# Patient Record
Sex: FEMALE | Race: WHITE | Employment: FULL TIME | URBAN - METROPOLITAN AREA
[De-identification: names, ages, dates, MRNs, and addresses within clinical notes are randomized per-mention and may not be internally consistent; named-entity substitution may affect disease eponyms.]

---

## 2022-02-23 ENCOUNTER — APPOINTMENT (OUTPATIENT)
Dept: CT IMAGING | Age: 42
End: 2022-02-23

## 2022-02-23 ENCOUNTER — HOSPITAL ENCOUNTER (EMERGENCY)
Age: 42
Discharge: HOME | End: 2022-02-23
Attending: EMERGENCY MEDICINE

## 2022-02-23 VITALS
DIASTOLIC BLOOD PRESSURE: 86 MMHG | HEART RATE: 88 BPM | WEIGHT: 165 LBS | BODY MASS INDEX: 25.9 KG/M2 | RESPIRATION RATE: 16 BRPM | SYSTOLIC BLOOD PRESSURE: 172 MMHG | OXYGEN SATURATION: 98 % | HEIGHT: 67 IN | TEMPERATURE: 97.6 F

## 2022-02-23 DIAGNOSIS — R51.9 ACUTE NONINTRACTABLE HEADACHE, UNSPECIFIED HEADACHE TYPE: Primary | ICD-10-CM

## 2022-02-23 DIAGNOSIS — J01.30 ACUTE NON-RECURRENT SPHENOIDAL SINUSITIS: ICD-10-CM

## 2022-02-23 LAB
ANION GAP SERPL CALCULATED.3IONS-SCNC: 14 MMOL/L (ref 7–16)
BASOPHILS ABSOLUTE: 0.03 E9/L (ref 0–0.2)
BASOPHILS RELATIVE PERCENT: 0.3 % (ref 0–2)
BUN BLDV-MCNC: 14 MG/DL (ref 6–20)
CALCIUM SERPL-MCNC: 9.3 MG/DL (ref 8.6–10.2)
CHLORIDE BLD-SCNC: 99 MMOL/L (ref 98–107)
CO2: 23 MMOL/L (ref 22–29)
CREAT SERPL-MCNC: 0.7 MG/DL (ref 0.5–1)
EOSINOPHILS ABSOLUTE: 0.17 E9/L (ref 0.05–0.5)
EOSINOPHILS RELATIVE PERCENT: 1.6 % (ref 0–6)
GFR AFRICAN AMERICAN: >60
GFR NON-AFRICAN AMERICAN: >60 ML/MIN/1.73
GLUCOSE BLD-MCNC: 128 MG/DL (ref 74–99)
HCT VFR BLD CALC: 39.2 % (ref 34–48)
HEMOGLOBIN: 12.8 G/DL (ref 11.5–15.5)
IMMATURE GRANULOCYTES #: 0.03 E9/L
IMMATURE GRANULOCYTES %: 0.3 % (ref 0–5)
LYMPHOCYTES ABSOLUTE: 2.1 E9/L (ref 1.5–4)
LYMPHOCYTES RELATIVE PERCENT: 19.7 % (ref 20–42)
MCH RBC QN AUTO: 26.8 PG (ref 26–35)
MCHC RBC AUTO-ENTMCNC: 32.7 % (ref 32–34.5)
MCV RBC AUTO: 82 FL (ref 80–99.9)
MONOCYTES ABSOLUTE: 0.69 E9/L (ref 0.1–0.95)
MONOCYTES RELATIVE PERCENT: 6.5 % (ref 2–12)
NEUTROPHILS ABSOLUTE: 7.65 E9/L (ref 1.8–7.3)
NEUTROPHILS RELATIVE PERCENT: 71.6 % (ref 43–80)
PDW BLD-RTO: 13 FL (ref 11.5–15)
PLATELET # BLD: 337 E9/L (ref 130–450)
PMV BLD AUTO: 10.8 FL (ref 7–12)
POTASSIUM REFLEX MAGNESIUM: 3.9 MMOL/L (ref 3.5–5)
RBC # BLD: 4.78 E12/L (ref 3.5–5.5)
SODIUM BLD-SCNC: 136 MMOL/L (ref 132–146)
WBC # BLD: 10.7 E9/L (ref 4.5–11.5)

## 2022-02-23 PROCEDURE — 96375 TX/PRO/DX INJ NEW DRUG ADDON: CPT

## 2022-02-23 PROCEDURE — 70450 CT HEAD/BRAIN W/O DYE: CPT

## 2022-02-23 PROCEDURE — 99282 EMERGENCY DEPT VISIT SF MDM: CPT

## 2022-02-23 PROCEDURE — 2580000003 HC RX 258: Performed by: STUDENT IN AN ORGANIZED HEALTH CARE EDUCATION/TRAINING PROGRAM

## 2022-02-23 PROCEDURE — 6360000002 HC RX W HCPCS: Performed by: STUDENT IN AN ORGANIZED HEALTH CARE EDUCATION/TRAINING PROGRAM

## 2022-02-23 PROCEDURE — 96374 THER/PROPH/DIAG INJ IV PUSH: CPT

## 2022-02-23 PROCEDURE — 85025 COMPLETE CBC W/AUTO DIFF WBC: CPT

## 2022-02-23 PROCEDURE — 80048 BASIC METABOLIC PNL TOTAL CA: CPT

## 2022-02-23 RX ORDER — DIPHENHYDRAMINE HYDROCHLORIDE 50 MG/ML
50 INJECTION INTRAMUSCULAR; INTRAVENOUS ONCE
Status: COMPLETED | OUTPATIENT
Start: 2022-02-23 | End: 2022-02-23

## 2022-02-23 RX ORDER — 0.9 % SODIUM CHLORIDE 0.9 %
1000 INTRAVENOUS SOLUTION INTRAVENOUS ONCE
Status: COMPLETED | OUTPATIENT
Start: 2022-02-23 | End: 2022-02-23

## 2022-02-23 RX ORDER — PROCHLORPERAZINE EDISYLATE 5 MG/ML
10 INJECTION INTRAMUSCULAR; INTRAVENOUS ONCE
Status: COMPLETED | OUTPATIENT
Start: 2022-02-23 | End: 2022-02-23

## 2022-02-23 RX ADMIN — DIPHENHYDRAMINE HYDROCHLORIDE 50 MG: 50 INJECTION, SOLUTION INTRAMUSCULAR; INTRAVENOUS at 16:40

## 2022-02-23 RX ADMIN — SODIUM CHLORIDE 1000 ML: 9 INJECTION, SOLUTION INTRAVENOUS at 16:40

## 2022-02-23 RX ADMIN — PROCHLORPERAZINE EDISYLATE 10 MG: 5 INJECTION INTRAMUSCULAR; INTRAVENOUS at 16:40

## 2022-02-23 ASSESSMENT — ENCOUNTER SYMPTOMS
PHOTOPHOBIA: 1
CHEST TIGHTNESS: 0
SORE THROAT: 0
ABDOMINAL PAIN: 0
SHORTNESS OF BREATH: 0
BACK PAIN: 0
EYE REDNESS: 0
VOMITING: 0
COUGH: 0
EYE PAIN: 1
DIARRHEA: 0
NAUSEA: 0
ABDOMINAL DISTENTION: 0

## 2022-02-23 ASSESSMENT — PAIN DESCRIPTION - PAIN TYPE: TYPE: ACUTE PAIN

## 2022-02-23 ASSESSMENT — PAIN - FUNCTIONAL ASSESSMENT: PAIN_FUNCTIONAL_ASSESSMENT: 0-10

## 2022-02-23 ASSESSMENT — PAIN SCALES - GENERAL: PAINLEVEL_OUTOF10: 9

## 2022-02-23 NOTE — ED PROVIDER NOTES
HPI     Patient is a 42 y.o. female presents with a chief complaint of migraine  This has been occurring for a few hours. Patient states that it gets better with nothing. Patient states that it gets worse with lights. Patient states that it is severe in severity. Patient states it was acute in onset. Patient presents with a chief complaint of migraine. Patient has a history of migraines. Patient stated that this hit her all at once. Patient is not on blood thinners. Patient does state that she has some blurriness of vision that is different from previous migraines. Patient denies any trauma. Patient states that her migraines are normally preceded by a smell. Patient denies any fevers, chills, chest pain, shortness of breath, nausea, vomiting, abdominal pain, change in urinary or bowel habits. Patient previously had her fallopian tubes tied. Patient denies any focal neurological deficit. Review of Systems   Constitutional: Negative for activity change, appetite change, chills, fatigue and fever. HENT: Negative for congestion, drooling and sore throat. Eyes: Positive for photophobia, pain and visual disturbance. Negative for redness. Respiratory: Negative for cough, chest tightness and shortness of breath. Cardiovascular: Negative for chest pain and palpitations. Gastrointestinal: Negative for abdominal distention, abdominal pain, diarrhea, nausea and vomiting. Genitourinary: Negative for decreased urine volume, difficulty urinating, enuresis, flank pain, frequency and hematuria. Musculoskeletal: Negative for arthralgias, back pain and neck stiffness. Skin: Negative for rash and wound. Neurological: Positive for headaches. Negative for dizziness, facial asymmetry and light-headedness. Psychiatric/Behavioral: Negative for agitation, confusion and decreased concentration. Physical Exam  Vitals and nursing note reviewed.    Constitutional:       Appearance: She is well-developed. HENT:      Head: Normocephalic and atraumatic. Eyes:      Conjunctiva/sclera: Conjunctivae normal.   Cardiovascular:      Rate and Rhythm: Normal rate and regular rhythm. Heart sounds: Normal heart sounds. No murmur heard. Pulmonary:      Effort: Pulmonary effort is normal. No respiratory distress. Breath sounds: Normal breath sounds. No wheezing or rales. Abdominal:      General: Bowel sounds are normal.      Palpations: Abdomen is soft. Tenderness: There is no abdominal tenderness. There is no guarding or rebound. Musculoskeletal:      Cervical back: Normal range of motion and neck supple. Skin:     General: Skin is warm and dry. Neurological:      Mental Status: She is alert and oriented to person, place, and time. Cranial Nerves: No cranial nerve deficit. Coordination: Coordination normal.          Procedures     MDM     ED Course as of 02/23/22 1951 Wed Feb 23, 2022 1818 Patient had significant improvement of her headache. Patient stated that she is willing to follow-up as an outpatient. [JM]      ED Course User Index  [JM] Asif Griffin MD      Patient is a 42 y.o. female presenting with migraine. Patient has migraines in the past.  Patient stated that she has some changes in vision. Patient has no other symptoms. Patient is neurologically intact. Patient is CT head. CT head is benign. Patient's lab work is benign. Patient was given Compazine and Benadryl. Patient was given a liter of fluids. Patient had significant improvement of her symptoms and was discharged home. Patient was given return precautions. Patient will follow up with their primary care provider. Patient is agreeable to this plan. Patient has remained stable throughout their stay in the ED.        Patient was seen and evaluated by myself and my attending Traci Shirley MD. Assessment and Plan discussed with attending provider, please see attestation for final plan of care.  This note was done using dictation software and there may be some grammatical errors associated with this. Wyatt Sorenson MD         ED Course as of 02/23/22 1952 Wed Feb 23, 2022 1818 Patient had significant improvement of her headache. Patient stated that she is willing to follow-up as an outpatient. [DAISY]      ED Course User Index  [JM] Wyatt Sorenson MD       --------------------------------------------- PAST HISTORY ---------------------------------------------  Past Medical History:  has no past medical history on file. Past Surgical History:  has no past surgical history on file. Social History:      Family History: family history is not on file. The patients home medications have been reviewed.     Allergies: Latex and Mushroom extract complex    -------------------------------------------------- RESULTS -------------------------------------------------  Labs:  Results for orders placed or performed during the hospital encounter of 02/23/22   CBC with Auto Differential   Result Value Ref Range    WBC 10.7 4.5 - 11.5 E9/L    RBC 4.78 3.50 - 5.50 E12/L    Hemoglobin 12.8 11.5 - 15.5 g/dL    Hematocrit 39.2 34.0 - 48.0 %    MCV 82.0 80.0 - 99.9 fL    MCH 26.8 26.0 - 35.0 pg    MCHC 32.7 32.0 - 34.5 %    RDW 13.0 11.5 - 15.0 fL    Platelets 860 721 - 004 E9/L    MPV 10.8 7.0 - 12.0 fL    Neutrophils % 71.6 43.0 - 80.0 %    Immature Granulocytes % 0.3 0.0 - 5.0 %    Lymphocytes % 19.7 (L) 20.0 - 42.0 %    Monocytes % 6.5 2.0 - 12.0 %    Eosinophils % 1.6 0.0 - 6.0 %    Basophils % 0.3 0.0 - 2.0 %    Neutrophils Absolute 7.65 (H) 1.80 - 7.30 E9/L    Immature Granulocytes # 0.03 E9/L    Lymphocytes Absolute 2.10 1.50 - 4.00 E9/L    Monocytes Absolute 0.69 0.10 - 0.95 E9/L    Eosinophils Absolute 0.17 0.05 - 0.50 E9/L    Basophils Absolute 0.03 0.00 - 0.20 N6/Z   Basic Metabolic Panel w/ Reflex to MG   Result Value Ref Range    Sodium 136 132 - 146 mmol/L    Potassium reflex Magnesium 3.9 3.5 - 5.0 mmol/L    Chloride 99 98 - 107 mmol/L    CO2 23 22 - 29 mmol/L    Anion Gap 14 7 - 16 mmol/L    Glucose 128 (H) 74 - 99 mg/dL    BUN 14 6 - 20 mg/dL    CREATININE 0.7 0.5 - 1.0 mg/dL    GFR Non-African American >60 >=60 mL/min/1.73    GFR African American >60     Calcium 9.3 8.6 - 10.2 mg/dL       Radiology:  CT Head WO Contrast   Final Result   No acute intracranial abnormality. Left sphenoid sinusitis.             ------------------------- NURSING NOTES AND VITALS REVIEWED ---------------------------  Date / Time Roomed:  2/23/2022  3:59 PM  ED Bed Assignment:  [facility]    The nursing notes within the ED encounter and vital signs as below have been reviewed. BP (!) 172/86   Pulse 88   Temp 97.6 °F (36.4 °C)   Resp 16   Ht 5' 7\" (1.702 m)   Wt 165 lb (74.8 kg)   SpO2 98%   BMI 25.84 kg/m²   Oxygen Saturation Interpretation: Normal      ------------------------------------------ PROGRESS NOTES ------------------------------------------  7:52 PM EST  I have spoken with the patient and family if present and discussed todays results, in addition to providing specific details for the plan of care and counseling regarding the diagnosis and prognosis. Their questions are answered at this time and they are agreeable with the plan. I discussed at length with them reasons for immediate return here for re evaluation. They will followup with their primary care provider by calling their office as soon as possible.      --------------------------------- ADDITIONAL PROVIDER NOTES ---------------------------------  At this time the patient is without objective evidence of an acute process requiring hospitalization or inpatient management. They have remained hemodynamically stable throughout their entire ED visit and are stable for discharge with outpatient follow-up.      The plan has been discussed in detail and they are aware of the specific conditions for emergent return, as well as the importance of follow-up. There are no discharge medications for this patient. Diagnosis:  1. Acute nonintractable headache, unspecified headache type    2. Acute non-recurrent sphenoidal sinusitis        Disposition:  Patient's disposition: Discharge to home  Patient's condition is stable.          Rosangela Pitts MD  Resident  02/23/22 0041

## 2024-05-14 ENCOUNTER — HOSPITAL ENCOUNTER (EMERGENCY)
Age: 44
Discharge: HOME | End: 2024-05-15
Payer: COMMERCIAL

## 2024-05-14 DIAGNOSIS — R59.0 SUBMANDIBULAR LYMPHADENOPATHY: ICD-10-CM

## 2024-05-14 DIAGNOSIS — M50.90 CERVICAL DISC DISEASE: Primary | ICD-10-CM

## 2024-05-14 DIAGNOSIS — R91.1 PULMONARY NODULE: ICD-10-CM

## 2024-05-14 DIAGNOSIS — E04.2 MULTIPLE THYROID NODULES: ICD-10-CM

## 2024-05-14 LAB
ANION GAP SERPL CALCULATED.3IONS-SCNC: 14 MMOL/L (ref 7–16)
BASOPHILS # BLD: 0.02 K/UL (ref 0–0.2)
BASOPHILS NFR BLD: 0 % (ref 0–2)
BUN SERPL-MCNC: 14 MG/DL (ref 6–20)
CALCIUM SERPL-MCNC: 9.3 MG/DL (ref 8.6–10.2)
CHLORIDE SERPL-SCNC: 103 MMOL/L (ref 98–107)
CO2 SERPL-SCNC: 21 MMOL/L (ref 22–29)
CREAT SERPL-MCNC: 0.8 MG/DL (ref 0.5–1)
EOSINOPHIL # BLD: 0.16 K/UL (ref 0.05–0.5)
EOSINOPHILS RELATIVE PERCENT: 2 % (ref 0–6)
ERYTHROCYTE [DISTWIDTH] IN BLOOD BY AUTOMATED COUNT: 13.2 % (ref 11.5–15)
GFR, ESTIMATED: >90 ML/MIN/1.73M2
GLUCOSE SERPL-MCNC: 136 MG/DL (ref 74–99)
HCT VFR BLD AUTO: 41 % (ref 34–48)
HGB BLD-MCNC: 13.4 G/DL (ref 11.5–15.5)
IMM GRANULOCYTES # BLD AUTO: 0.03 K/UL (ref 0–0.58)
IMM GRANULOCYTES NFR BLD: 0 % (ref 0–5)
LYMPHOCYTES NFR BLD: 1.91 K/UL (ref 1.5–4)
LYMPHOCYTES RELATIVE PERCENT: 20 % (ref 20–42)
MCH RBC QN AUTO: 26.7 PG (ref 26–35)
MCHC RBC AUTO-ENTMCNC: 32.7 G/DL (ref 32–34.5)
MCV RBC AUTO: 81.8 FL (ref 80–99.9)
MONOCYTES NFR BLD: 0.5 K/UL (ref 0.1–0.95)
MONOCYTES NFR BLD: 5 % (ref 2–12)
NEUTROPHILS NFR BLD: 73 % (ref 43–80)
NEUTS SEG NFR BLD: 6.94 K/UL (ref 1.8–7.3)
PLATELET # BLD AUTO: 406 K/UL (ref 130–450)
PMV BLD AUTO: 11 FL (ref 7–12)
POTASSIUM SERPL-SCNC: 4 MMOL/L (ref 3.5–5)
RBC # BLD AUTO: 5.01 M/UL (ref 3.5–5.5)
SODIUM SERPL-SCNC: 138 MMOL/L (ref 132–146)
WBC OTHER # BLD: 9.6 K/UL (ref 4.5–11.5)

## 2024-05-14 PROCEDURE — 6370000000 HC RX 637 (ALT 250 FOR IP): Performed by: PHYSICIAN ASSISTANT

## 2024-05-14 PROCEDURE — 2580000003 HC RX 258: Performed by: PHYSICIAN ASSISTANT

## 2024-05-14 PROCEDURE — 80048 BASIC METABOLIC PNL TOTAL CA: CPT

## 2024-05-14 PROCEDURE — 99285 EMERGENCY DEPT VISIT HI MDM: CPT

## 2024-05-14 PROCEDURE — 96374 THER/PROPH/DIAG INJ IV PUSH: CPT

## 2024-05-14 PROCEDURE — 85025 COMPLETE CBC W/AUTO DIFF WBC: CPT

## 2024-05-14 RX ORDER — 0.9 % SODIUM CHLORIDE 0.9 %
1000 INTRAVENOUS SOLUTION INTRAVENOUS ONCE
Status: COMPLETED | OUTPATIENT
Start: 2024-05-14 | End: 2024-05-15

## 2024-05-14 RX ORDER — HYDROCODONE BITARTRATE AND ACETAMINOPHEN 5; 325 MG/1; MG/1
1 TABLET ORAL ONCE
Status: COMPLETED | OUTPATIENT
Start: 2024-05-14 | End: 2024-05-14

## 2024-05-14 RX ADMIN — SODIUM CHLORIDE 1000 ML: 9 INJECTION, SOLUTION INTRAVENOUS at 23:19

## 2024-05-14 RX ADMIN — HYDROCODONE BITARTRATE AND ACETAMINOPHEN 1 TABLET: 5; 325 TABLET ORAL at 23:19

## 2024-05-14 ASSESSMENT — PAIN DESCRIPTION - LOCATION: LOCATION: JAW;NECK

## 2024-05-14 ASSESSMENT — PAIN DESCRIPTION - DESCRIPTORS: DESCRIPTORS: SHARP;BURNING

## 2024-05-14 ASSESSMENT — PAIN DESCRIPTION - ORIENTATION: ORIENTATION: RIGHT

## 2024-05-14 ASSESSMENT — PAIN SCALES - GENERAL: PAINLEVEL_OUTOF10: 9

## 2024-05-15 ENCOUNTER — APPOINTMENT (OUTPATIENT)
Dept: CT IMAGING | Age: 44
End: 2024-05-15
Payer: COMMERCIAL

## 2024-05-15 VITALS
BODY MASS INDEX: 40.18 KG/M2 | DIASTOLIC BLOOD PRESSURE: 104 MMHG | TEMPERATURE: 97.7 F | HEIGHT: 66 IN | SYSTOLIC BLOOD PRESSURE: 151 MMHG | HEART RATE: 89 BPM | OXYGEN SATURATION: 98 % | WEIGHT: 250 LBS | RESPIRATION RATE: 18 BRPM

## 2024-05-15 PROCEDURE — 70491 CT SOFT TISSUE NECK W/DYE: CPT

## 2024-05-15 PROCEDURE — 6360000002 HC RX W HCPCS

## 2024-05-15 PROCEDURE — 6360000004 HC RX CONTRAST MEDICATION: Performed by: RADIOLOGY

## 2024-05-15 RX ORDER — METHYLPREDNISOLONE 4 MG/1
TABLET ORAL
Qty: 21 TABLET | Refills: 0 | Status: SHIPPED | OUTPATIENT
Start: 2024-05-15 | End: 2024-05-21

## 2024-05-15 RX ORDER — IBUPROFEN 800 MG/1
800 TABLET ORAL EVERY 6 HOURS PRN
Qty: 20 TABLET | Refills: 0 | Status: SHIPPED | OUTPATIENT
Start: 2024-05-15 | End: 2024-05-15

## 2024-05-15 RX ORDER — METHOCARBAMOL 750 MG/1
750 TABLET, FILM COATED ORAL 4 TIMES DAILY
Qty: 40 TABLET | Refills: 0 | Status: SHIPPED | OUTPATIENT
Start: 2024-05-15 | End: 2024-05-15

## 2024-05-15 RX ORDER — HYDROCODONE BITARTRATE AND ACETAMINOPHEN 5; 325 MG/1; MG/1
1 TABLET ORAL EVERY 6 HOURS PRN
Qty: 7 TABLET | Refills: 0 | Status: SHIPPED | OUTPATIENT
Start: 2024-05-15 | End: 2024-05-15

## 2024-05-15 RX ORDER — METHYLPREDNISOLONE 4 MG/1
TABLET ORAL
Qty: 21 TABLET | Refills: 0 | Status: SHIPPED | OUTPATIENT
Start: 2024-05-15 | End: 2024-05-15

## 2024-05-15 RX ORDER — HYDROCODONE BITARTRATE AND ACETAMINOPHEN 5; 325 MG/1; MG/1
1 TABLET ORAL EVERY 6 HOURS PRN
Qty: 7 TABLET | Refills: 0 | Status: SHIPPED | OUTPATIENT
Start: 2024-05-15 | End: 2024-05-17

## 2024-05-15 RX ORDER — METHOCARBAMOL 750 MG/1
750 TABLET, FILM COATED ORAL 4 TIMES DAILY
Qty: 40 TABLET | Refills: 0 | Status: SHIPPED | OUTPATIENT
Start: 2024-05-15 | End: 2024-05-25

## 2024-05-15 RX ORDER — IBUPROFEN 800 MG/1
800 TABLET ORAL EVERY 6 HOURS PRN
Qty: 20 TABLET | Refills: 0 | Status: SHIPPED | OUTPATIENT
Start: 2024-05-15 | End: 2024-05-20

## 2024-05-15 RX ORDER — DEXAMETHASONE SODIUM PHOSPHATE 10 MG/ML
8 INJECTION INTRAMUSCULAR; INTRAVENOUS ONCE
Status: COMPLETED | OUTPATIENT
Start: 2024-05-15 | End: 2024-05-15

## 2024-05-15 RX ORDER — DEXAMETHASONE SODIUM PHOSPHATE 10 MG/ML
INJECTION INTRAMUSCULAR; INTRAVENOUS
Status: COMPLETED
Start: 2024-05-15 | End: 2024-05-15

## 2024-05-15 RX ADMIN — IOPAMIDOL 75 ML: 755 INJECTION, SOLUTION INTRAVENOUS at 02:06

## 2024-05-15 RX ADMIN — DEXAMETHASONE SODIUM PHOSPHATE 8 MG: 10 INJECTION INTRAMUSCULAR; INTRAVENOUS at 03:39

## 2024-05-15 NOTE — DISCHARGE INSTR - COC
that she requires {Admit to Appropriate Level of Care:15113} for {GREATER/LESS:106349653} 30 days.     Update Admission H&P: {CHP DME Changes in HandP:668042567}    PHYSICIAN SIGNATURE:  {Esignature:[id number]}

## 2024-05-15 NOTE — ED PROVIDER NOTES
Independent BETH Visit.        [facility] EMERGENCY DEPARTMENT  ED  Encounter Note  Admit Date/RoomTime: 2024 10:29 PM  ED Room: 35/35  NAME: Prema Barney  : 1980  MRN: [id number]  PCP: No primary care provider on file.    CHIEF COMPLAINT     Jaw Pain (Starts behind the ear and goes into the neck.  All day today  missing half a tooth on that side )    HISTORY OF PRESENT ILLNESS        Prema Barney is a 43 y.o. female who presents to the ED by private vehicle for right neck pain starting behind right ear radiating down the right side of neck, beginning 1 day(s) ago. The complaint has been persistent and are severe in severity.  She denies fever or chills, she reports numerous broken teeth but have been this way for years without giving her trouble.  She denies sore throat, body aches, chills, vomiting, headache. She reports on Saturday she had pain the right upper back when she woke with pain when turning head but took her last muscle relaxer that evening and it was better by  morning.  She reports hx of cervcial and lumbar disc disease.    REVIEW OF SYSTEMS     Pertinent positives and negatives are stated within HPI, all other systems reviewed and are negative.    Past Medical History:  has no past medical history on file.  Surgical History:  has no past surgical history on file.  Social History:    Family History: family history is not on file.   Allergies: Latex and Mushroom extract complex  CURRENT MEDICATIONS       Discharge Medication List as of 5/15/2024  3:48 AM          SCREENINGS     [place] Coma Scale  Eye Opening: Spontaneous  Best Verbal Response: Oriented  Best Motor Response: Obeys commands  [place] Coma Scale Score: 15         CIWA Assessment  BP: (!) 151/104  Pulse: 89       PHYSICAL EXAM   Oxygen Saturation Interpretation: Normal on room air analysis.        ED Triage Vitals [24]   BP Temp Temp src Pulse Respirations SpO2 Height Weight -

## 2024-06-03 SDOH — HEALTH STABILITY: PHYSICAL HEALTH: ON AVERAGE, HOW MANY DAYS PER WEEK DO YOU ENGAGE IN MODERATE TO STRENUOUS EXERCISE (LIKE A BRISK WALK)?: 1 DAY

## 2024-06-03 SDOH — HEALTH STABILITY: PHYSICAL HEALTH: ON AVERAGE, HOW MANY MINUTES DO YOU ENGAGE IN EXERCISE AT THIS LEVEL?: 30 MIN

## 2024-06-04 ENCOUNTER — OFFICE VISIT (OUTPATIENT)
Dept: FAMILY MEDICINE CLINIC | Age: 44
End: 2024-06-04
Payer: COMMERCIAL

## 2024-06-04 VITALS
HEIGHT: 66 IN | DIASTOLIC BLOOD PRESSURE: 86 MMHG | RESPIRATION RATE: 16 BRPM | BODY MASS INDEX: 45.48 KG/M2 | OXYGEN SATURATION: 99 % | TEMPERATURE: 97.6 F | WEIGHT: 283 LBS | HEART RATE: 99 BPM | SYSTOLIC BLOOD PRESSURE: 133 MMHG

## 2024-06-04 DIAGNOSIS — E04.1 THYROID NODULE: ICD-10-CM

## 2024-06-04 DIAGNOSIS — R73.9 HYPERGLYCEMIA: Primary | ICD-10-CM

## 2024-06-04 DIAGNOSIS — Z29.9 PREVENTIVE MEASURE: ICD-10-CM

## 2024-06-04 DIAGNOSIS — E66.01 CLASS 3 SEVERE OBESITY WITH BODY MASS INDEX (BMI) OF 45.0 TO 49.9 IN ADULT, UNSPECIFIED OBESITY TYPE, UNSPECIFIED WHETHER SERIOUS COMORBIDITY PRESENT (HCC): ICD-10-CM

## 2024-06-04 DIAGNOSIS — R91.1 LUNG NODULE SEEN ON IMAGING STUDY: ICD-10-CM

## 2024-06-04 PROBLEM — E66.813 CLASS 3 SEVERE OBESITY WITH BODY MASS INDEX (BMI) OF 45.0 TO 49.9 IN ADULT: Status: ACTIVE | Noted: 2024-06-04

## 2024-06-04 LAB
ALBUMIN: 4.4 G/DL (ref 3.5–5.2)
ALP BLD-CCNC: 112 U/L (ref 35–104)
ALT SERPL-CCNC: 18 U/L (ref 0–32)
ANION GAP SERPL CALCULATED.3IONS-SCNC: 20 MMOL/L (ref 7–16)
AST SERPL-CCNC: 21 U/L (ref 0–31)
BILIRUB SERPL-MCNC: 0.3 MG/DL (ref 0–1.2)
BUN BLDV-MCNC: 11 MG/DL (ref 6–20)
CALCIUM SERPL-MCNC: 9.2 MG/DL (ref 8.6–10.2)
CHLORIDE BLD-SCNC: 103 MMOL/L (ref 98–107)
CHOLESTEROL, TOTAL: 299 MG/DL
CO2: 18 MMOL/L (ref 22–29)
CREAT SERPL-MCNC: 0.8 MG/DL (ref 0.5–1)
GFR, ESTIMATED: >90 ML/MIN/1.73M2
GLUCOSE BLD-MCNC: 129 MG/DL (ref 74–99)
HBA1C MFR BLD: 7 % (ref 4–5.6)
HDLC SERPL-MCNC: 56 MG/DL
LDL CHOLESTEROL: 213 MG/DL
POTASSIUM SERPL-SCNC: 4.4 MMOL/L (ref 3.5–5)
SODIUM BLD-SCNC: 141 MMOL/L (ref 132–146)
T3 TOTAL: 149 NG/DL (ref 80–200)
T4 FREE: 1.1 NG/DL (ref 0.9–1.7)
TOTAL PROTEIN: 8 G/DL (ref 6.4–8.3)
TRIGL SERPL-MCNC: 148 MG/DL
TSH SERPL DL<=0.05 MIU/L-ACNC: 1.41 UIU/ML (ref 0.27–4.2)
VLDLC SERPL CALC-MCNC: 30 MG/DL

## 2024-06-04 PROCEDURE — 99204 OFFICE O/P NEW MOD 45 MIN: CPT

## 2024-06-04 SDOH — ECONOMIC STABILITY: HOUSING INSECURITY
IN THE LAST 12 MONTHS, WAS THERE A TIME WHEN YOU DID NOT HAVE A STEADY PLACE TO SLEEP OR SLEPT IN A SHELTER (INCLUDING NOW)?: NO

## 2024-06-04 SDOH — ECONOMIC STABILITY: FOOD INSECURITY: WITHIN THE PAST 12 MONTHS, THE FOOD YOU BOUGHT JUST DIDN'T LAST AND YOU DIDN'T HAVE MONEY TO GET MORE.: NEVER TRUE

## 2024-06-04 SDOH — ECONOMIC STABILITY: INCOME INSECURITY: HOW HARD IS IT FOR YOU TO PAY FOR THE VERY BASICS LIKE FOOD, HOUSING, MEDICAL CARE, AND HEATING?: NOT HARD AT ALL

## 2024-06-04 SDOH — ECONOMIC STABILITY: FOOD INSECURITY: WITHIN THE PAST 12 MONTHS, YOU WORRIED THAT YOUR FOOD WOULD RUN OUT BEFORE YOU GOT MONEY TO BUY MORE.: NEVER TRUE

## 2024-06-04 ASSESSMENT — ENCOUNTER SYMPTOMS
SORE THROAT: 0
ABDOMINAL PAIN: 0
VOMITING: 0
SHORTNESS OF BREATH: 0
DIARRHEA: 0
CHEST TIGHTNESS: 0
COUGH: 1
NAUSEA: 0
RHINORRHEA: 0
CONSTIPATION: 0

## 2024-06-04 ASSESSMENT — PATIENT HEALTH QUESTIONNAIRE - PHQ9
SUM OF ALL RESPONSES TO PHQ QUESTIONS 1-9: 2
2. FEELING DOWN, DEPRESSED OR HOPELESS: SEVERAL DAYS
1. LITTLE INTEREST OR PLEASURE IN DOING THINGS: SEVERAL DAYS
SUM OF ALL RESPONSES TO PHQ QUESTIONS 1-9: 2
SUM OF ALL RESPONSES TO PHQ9 QUESTIONS 1 & 2: 2

## 2024-06-04 NOTE — PROGRESS NOTES
[facility]  Precepting Note    Subjective:  44 yo F here for ED visit  She is a new patient  Was having right sided neck pain  She had CT scan     BP Readings from Last 3 Encounters:   06/04/24 133/86   05/15/24 (!) 151/104   02/23/22 (!) 172/86     CT soft tissue neck 5/14/24  IMPRESSION:  1. Bilateral thyroid nodules, left greater than right.  No new margined,  follow-up thyroid ultrasound study recommended.  2. The right submandibular gland is mildly larger in size than the left  submandibular gland. There is no definable inflammatory process involving any  of the salivary glands.  3. There is a 6.5 mm noncalcified pulmonary nodule in the visualized anterior  portion of the middle lobe of right lung.  Follow-up noncontrast CT scan of  chest in 6-12 months, recommended.  If the patient has high risk of lung  cancer, another follow-up CT scan of chest in 18-24 months would be indicated.  4. In the cervical spine multilevel mild-to-moderate degenerative disc  disease and facet osteoarthritis with multilevel moderate neural foraminal  stenosis in the lower and mid cervical spine.    No FH of thyroid  No hair/ nail changes  She occasionally feels flushing  Recently moved from [place]  She does have tobacco use history - 5 years   No cough/ shortness of breath/ sputum.      Note glucose 136  Has been told that her sugars was high in the past but no formal diagnosis of diabetes  Has been on steroids      ROS otherwise negative    Past medical, surgical, family and social history were reviewed, non-contributory, and unchanged unless otherwise stated.    Objective:    /86   Pulse 99   Temp 97.6 °F (36.4 °C) (Temporal)   Resp 16   Ht 1.676 m (5' 5.98\")   Wt 128.4 kg (283 lb)   LMP 05/11/2024   SpO2 99%   BMI 45.70 kg/m²     Exam is as noted by resident with the following changes, additions or corrections:    General:  NAD; alert & oriented x 3   Enlarge neck 
This patient was screened with SUBS screening tool.   On 6/4/2024 the patient has a Negative Screen   
info from pharmacy to assure aware of all possible risks and side effects of medication before taking.     Patient and/or guardian given opportunity to ask questions/raise concerns.The patient verbalized comfort and understanding of instructions.    I encourage further reading and education about your health conditions.Information on many health conditions is provided by the American Academy of Family Physicians: https://familydoctor.org/  Please bring any questions to me at your next visit.    Medication List:    Current Outpatient Medications   Medication Sig Dispense Refill    ibuprofen (ADVIL;MOTRIN) 800 MG tablet Take 1 tablet by mouth every 6 hours as needed for Pain 20 tablet 0     No current facility-administered medications for this visit.      Return to Office: Return in about 1 month (around 7/4/2024) for thyroid .      This document may have been prepared at least partially through the use of voice recognition software. Although effort is taken to assure the accuracy of this document, it is possible that grammatical, syntax,  or spelling errors may occur.    Natty Pierce MD

## 2024-06-04 NOTE — PATIENT INSTRUCTIONS
[place] [employer]*  (Call United Way/211 if need more resources.)         HELP NETWORK OF [facility]:  What they do: Provides 24-hr, 7 days a week access to information on community resources for financial help. [facility] AND [facility]  Phone: 211 or [phone]    [facility] FAMILY SERVICE: [address], [place] [address]  What they offer: Limited assistance to restore/ prevent utility disconnection.  Phone Number: [phone]  Website: [employer]    DEPARTMENT OF JOB AND FAMILY SERVICES:  MAIN [facility] LINE FOR ALL [facility]: [phone]  What they do: [place] works first with temporary cash assistance if there are children in household.   [facility] DJFS: [address] [place] [address]  Phone: [phone], [phone]  [facility] DJFS: [address] [place] [address]  Phone: [phone]  [facility] DJFS: [address] [place] [address]  Phone: [phone]  Website: s.[place].[facility]    [employer]  What they offer: Assistance with [employer] bills  Phone: [phone], option #5     Medications:  Good Rx  What they offer: Good Rx tracks prescription drug prices and provides free drug coupons for discounts on medications.  Website: https://www.[employer]    [employer]  What they offer: [employer] offers free information on medications and healthcare cost savings programs including prescription assistance programs, coupons, and discount programs.  Helpline: 999.618.9489  Website: https://www.[employer].org    RX Assist  What they offer: Information about free and low-cost medicine programs.  Website: https://www.rxassist.org/    [employer] $4 Prescription Program  What they offer: Prescription Program includes up to a 30-day supply for $4 and a 90-day supply for $10 of some covered generic drugs at commonly prescribed dosages  Website: https://www.[employer]/cp/4-prescriptions/5086937    [employer]

## 2024-06-05 DIAGNOSIS — E78.5 HYPERLIPIDEMIA, UNSPECIFIED HYPERLIPIDEMIA TYPE: Primary | ICD-10-CM

## 2024-06-05 DIAGNOSIS — E11.9 TYPE 2 DIABETES MELLITUS WITHOUT COMPLICATION, WITHOUT LONG-TERM CURRENT USE OF INSULIN (HCC): ICD-10-CM

## 2024-06-05 LAB
HEPATITIS C ANTIBODY: NONREACTIVE
HIV AG/AB: NONREACTIVE

## 2024-06-05 RX ORDER — ATORVASTATIN CALCIUM 20 MG/1
20 TABLET, FILM COATED ORAL DAILY
Qty: 30 TABLET | Refills: 0 | Status: SHIPPED | OUTPATIENT
Start: 2024-06-05

## 2024-06-13 ENCOUNTER — HOSPITAL ENCOUNTER (OUTPATIENT)
Dept: ULTRASOUND IMAGING | Age: 44
Discharge: HOME | End: 2024-06-15
Payer: COMMERCIAL

## 2024-06-13 DIAGNOSIS — E04.1 THYROID NODULE: ICD-10-CM

## 2024-06-13 PROCEDURE — 76536 US EXAM OF HEAD AND NECK: CPT

## 2024-06-25 ENCOUNTER — OFFICE VISIT (OUTPATIENT)
Dept: FAMILY MEDICINE CLINIC | Age: 44
End: 2024-06-25
Payer: COMMERCIAL

## 2024-06-25 VITALS
SYSTOLIC BLOOD PRESSURE: 127 MMHG | HEIGHT: 66 IN | HEART RATE: 90 BPM | WEIGHT: 284.61 LBS | OXYGEN SATURATION: 97 % | BODY MASS INDEX: 45.74 KG/M2 | RESPIRATION RATE: 18 BRPM | TEMPERATURE: 97.8 F | DIASTOLIC BLOOD PRESSURE: 93 MMHG

## 2024-06-25 DIAGNOSIS — E04.1 THYROID NODULE, UNINODULAR: Primary | ICD-10-CM

## 2024-06-25 DIAGNOSIS — M47.25 OSTEOARTHRITIS OF SPINE WITH RADICULOPATHY, THORACOLUMBAR REGION: ICD-10-CM

## 2024-06-25 DIAGNOSIS — E13.9 OTHER SPECIFIED DIABETES MELLITUS WITHOUT COMPLICATION, WITHOUT LONG-TERM CURRENT USE OF INSULIN (HCC): ICD-10-CM

## 2024-06-25 PROCEDURE — 3051F HG A1C>EQUAL 7.0%<8.0%: CPT

## 2024-06-25 PROCEDURE — 99213 OFFICE O/P EST LOW 20 MIN: CPT

## 2024-06-25 NOTE — PROGRESS NOTES
[facility]  Department of Family Medicine  Family Medicine Residency Program      Patient: Prema Barney 44 y.o. female     Date of Service: 24      Chief complaint:   Chief Complaint   Patient presents with    Results    Mass     RUQ about 2-3\" long, noticed about a few weeks ago, painful to touch       HISTORY OF PRESENTING ILLNESS     44 y.o. female presented to the clinic.      CT neck showed nodule in thyroid.  Recent USG also shows nodule on the left thyroid.  Feeling tired, sleepy. Sorethroat. Somoetimes SOB  - no smoking since          Hx of chronic disc degenratuion, wants to Fu with neuro and pain , 'was following up in [place] long time ago.        Health Maintenance:  Health Maintenance Due   Topic Date Due    Hepatitis B vaccine (1 of 3 - 3-dose series) Never done    COVID-19 Vaccine (1) Never done    Pneumococcal 0-64 years Vaccine (1 of 2 - PCV) Never done    Diabetic Alb to Cr ratio (uACR) test  Never done    DTaP/Tdap/Td vaccine (1 - Tdap) Never done    Cervical cancer screen  Never done    Breast cancer screen  Never done    Colorectal Cancer Screen  2024     Past Medical History:      Diagnosis Date    Depression      Past Surgical History:        Procedure Laterality Date    LITHOTRIPSY      2019    TUBAL LIGATION      2002     Allergies:    Latex and Mushroom extract complex  Social History:   Social History     Socioeconomic History    Marital status: Single     Spouse name: Not on file    Number of children: Not on file    Years of education: Not on file    Highest education level: Not on file   Occupational History    Not on file   Tobacco Use    Smoking status: Former     Current packs/day: 0.00     Average packs/day: 1 pack/day for 5.0 years (5.0 ttl pk-yrs)     Types: Cigarettes     Start date:      Quit date: 2000     Years since quittin.5     Passive exposure: Never    Smokeless tobacco: Never   Vaping Use    Vaping Use: Never used

## 2024-06-25 NOTE — PROGRESS NOTES
S: 44 y.o. female with   Chief Complaint   Patient presents with    Results    Mass     RUQ about 2-3\" long, noticed about a few weeks ago, painful to touch       Thyroid nodule found on CT from ER.  U/S also demonstrates nodule    O: VS:  height is 1.676 m (5' 6\") and weight is 129.1 kg (284 lb 9.8 oz). Her temporal temperature is 97.8 °F (36.6 °C). Her blood pressure is 127/93 (abnormal) and her pulse is 90. Her respiration is 18 and oxygen saturation is 97%.   BP Readings from Last 3 Encounters:   06/25/24 (!) 127/93   06/04/24 133/86   05/15/24 (!) 151/104     See resident note      Impression/Plan:   1) Thyroid Nodule - Needs FNA.  Refer to ENT.   Pulm nodule follow up in 6m  Refer for neck OA      Health Maintenance Due   Topic Date Due    Hepatitis B vaccine (1 of 3 - 3-dose series) Never done    COVID-19 Vaccine (1) Never done    Pneumococcal 0-64 years Vaccine (1 of 2 - PCV) Never done    DTaP/Tdap/Td vaccine (1 - Tdap) Never done    Cervical cancer screen  Never done    Breast cancer screen  Never done    Colorectal Cancer Screen  06/18/2024         Attending Physician Statement  I have discussed the case, including pertinent history and exam findings with the resident.  I agree with the documented assessment and plan.      Aristides Downey MD

## 2024-06-26 ASSESSMENT — ENCOUNTER SYMPTOMS
COUGH: 0
SORE THROAT: 0
ABDOMINAL PAIN: 0
CONSTIPATION: 0
NAUSEA: 0
SHORTNESS OF BREATH: 0
CHEST TIGHTNESS: 0
DIARRHEA: 0
BACK PAIN: 1
VOMITING: 0
RHINORRHEA: 0

## 2024-06-27 ENCOUNTER — TELEPHONE (OUTPATIENT)
Dept: FAMILY MEDICINE CLINIC | Age: 44
End: 2024-06-27

## 2024-06-27 DIAGNOSIS — E04.1 THYROID NODULE: Primary | ICD-10-CM

## 2024-06-27 NOTE — TELEPHONE ENCOUNTER
Last Appointment   6/25/2024  Next Appointment  Visit date not found  Dr Gutierrez's office called in, dr reviewed her US sound results and would like for us to order a fine needle aspiration with jamar so that when she is seen by him this testing is complete, he is scheduling out 2-3 months. Please order and advise patient.

## 2024-06-28 LAB — THYROID PEROXIDASE (TPO) AB: <4 IU/ML (ref 0–25)

## 2024-07-08 DIAGNOSIS — E78.5 HYPERLIPIDEMIA, UNSPECIFIED HYPERLIPIDEMIA TYPE: ICD-10-CM

## 2024-07-08 NOTE — TELEPHONE ENCOUNTER
Last Appointment   6/25/2024  Next Appointment  Return in about 3 months (around 9/25/2024) for diabetes follow up, thyroid .

## 2024-07-09 RX ORDER — ATORVASTATIN CALCIUM 20 MG/1
20 TABLET, FILM COATED ORAL DAILY
Qty: 30 TABLET | Refills: 0 | Status: SHIPPED | OUTPATIENT
Start: 2024-07-09

## 2024-07-10 ENCOUNTER — OFFICE VISIT (OUTPATIENT)
Age: 44
End: 2024-07-10
Payer: COMMERCIAL

## 2024-07-10 VITALS
DIASTOLIC BLOOD PRESSURE: 101 MMHG | HEIGHT: 66 IN | SYSTOLIC BLOOD PRESSURE: 151 MMHG | TEMPERATURE: 96.9 F | WEIGHT: 286 LBS | HEART RATE: 90 BPM | BODY MASS INDEX: 45.96 KG/M2 | RESPIRATION RATE: 16 BRPM | OXYGEN SATURATION: 100 %

## 2024-07-10 DIAGNOSIS — M47.812 CERVICAL SPONDYLOSIS: ICD-10-CM

## 2024-07-10 DIAGNOSIS — M54.12 CERVICAL RADICULOPATHY: ICD-10-CM

## 2024-07-10 DIAGNOSIS — M25.561 BILATERAL CHRONIC KNEE PAIN: ICD-10-CM

## 2024-07-10 DIAGNOSIS — G89.29 BILATERAL CHRONIC KNEE PAIN: ICD-10-CM

## 2024-07-10 DIAGNOSIS — M25.562 BILATERAL CHRONIC KNEE PAIN: ICD-10-CM

## 2024-07-10 DIAGNOSIS — M54.2 CHRONIC NECK PAIN: Primary | ICD-10-CM

## 2024-07-10 DIAGNOSIS — M50.30 DDD (DEGENERATIVE DISC DISEASE), CERVICAL: ICD-10-CM

## 2024-07-10 DIAGNOSIS — G89.29 CHRONIC NECK PAIN: Primary | ICD-10-CM

## 2024-07-10 DIAGNOSIS — M48.02 CERVICAL STENOSIS OF SPINE: ICD-10-CM

## 2024-07-10 PROCEDURE — 99204 OFFICE O/P NEW MOD 45 MIN: CPT | Performed by: STUDENT IN AN ORGANIZED HEALTH CARE EDUCATION/TRAINING PROGRAM

## 2024-07-10 RX ORDER — PREGABALIN 25 MG/1
CAPSULE ORAL
Qty: 69 CAPSULE | Refills: 0 | Status: SHIPPED | OUTPATIENT
Start: 2024-07-10 | End: 2024-08-09

## 2024-07-10 NOTE — PROGRESS NOTES
Patient:  Prema Barney,  1980  Date of Service:  7/10/24      Patient presents to [facility] with complaints of neck pain     Pain:  Location: neck  Inciting Factor: cervical disc disease   Duration: 5-6 years  Description: constant  Quality: sharp, throbbing, and and sore.    Level (worst): 10  Radiation:  yes -bilateral arm  Numbness/Tingling: yes - bilateral arm  Aggravating factors: movement, walking.   Alleviating factors: OTC NSAIDS.    Blood Thinners/Anticoagulation:    If so, managed by:   Herbal Supplements: no    Medications:    NSAID's :  IBU PRN  Tylenol: No   Patches/Gels:   Lidocaine rub  Membrane stabilizers :   Current -    Previous - gabapentin (NEURONTIN)  Opioids :   Current -   Previous -   oxycodone/acetaminophen (Percocet) and Vicodin  Muscle Relaxants:   cyclobenzaprine (Flexeril) in the past  Steroids: yes - steroid packs in the past   Benzodiazepines:    Anti-depres/Anti-Pscyh: Buproprion (WELLBUTRIN) in the past  Adjuvants or Others : no      Previous treatments:    Physical Therapy : No    Chiropractic treatment: Yes   TENS Unit: No   Previous Pain Physicians: yes - tired a pain management in [place] doesn't recall the name. Believes the practice is now closed permanently.    Previous Procedure: yes, had injection in her knees and back, unsure of the type of injections.     BP (!) 151/101   Pulse 90   Temp 96.9 °F (36.1 °C) (Infrared)   Resp 16   Ht 1.676 m (5' 6\")   Wt 129.7 kg (286 lb)   LMP 2024 (Approximate)   SpO2 100%   BMI 46.16 kg/m²     Patient's last menstrual period was 2024 (approximate).    
loco    Impression:    Assessment:    ICD-10-CM    1. Chronic neck pain  M54.2 [facility]    G89.29 MRI CERVICAL SPINE WO CONTRAST      2. Cervical spondylosis  M47.812 [facility]     MRI CERVICAL SPINE WO CONTRAST      3. Cervical radiculopathy  M54.12 [facility]     MRI CERVICAL SPINE WO CONTRAST     pregabalin (LYRICA) 25 MG capsule      4. Cervical stenosis of spine  M48.02 [facility]     MRI CERVICAL SPINE WO CONTRAST      5. DDD (degenerative disc disease), cervical  M50.30 [facility]     MRI CERVICAL SPINE WO CONTRAST      6. Bilateral chronic knee pain  M25.561 XR KNEE LEFT (1-2 VIEWS)    M25.562 XR KNEE RIGHT (1-2 VIEWS)    G89.29           44 y.o. female with h/o   DHAVAL, MDD, HLD, OA  who p/w chronic neck pain with radiation of symptoms down both arms and some numbness tingling in the hands, as well as chronic bilateral knee pain.  Patient previously seen pain management in [facility] and did have some injections in her back and knees, unsure of the type of injection..     Previous treatments included ibuprofen, lidocaine rub, gabapentin, Percocet, Flexeril, steroids, Wellbutrin, PT, chiropractic therapy, injections.    Imaging and studies were reviewed with patient including CT soft tissue neck 5/20/2024 showed multilevel degenerative disc disease with some neuroforaminal and central stenosis at C4-5 and C5-6 with spondylosis at multiple levels.      Differential diagnosis includes (but is not limited to)  Myofascial Pain Syndrome, Cervical Spondylosis, Cervical Radiculopathy, Cervical Spinal Stenosis, knee O/A .     Our treatment plan will start with physical therapy for the neck and knees.  Next we will obtain x-ray of the knees bilaterally to rule out any significant bony pathology.  Will also obtain MRI

## 2024-07-16 LAB
HCG, URINE, POC: NEGATIVE
Lab: NORMAL
NEGATIVE QC PASS/FAIL: NORMAL
POSITIVE QC PASS/FAIL: NORMAL

## 2024-07-19 ENCOUNTER — HOSPITAL ENCOUNTER (OUTPATIENT)
Dept: MRI IMAGING | Age: 44
Discharge: HOME | End: 2024-07-19
Attending: STUDENT IN AN ORGANIZED HEALTH CARE EDUCATION/TRAINING PROGRAM
Payer: COMMERCIAL

## 2024-07-19 DIAGNOSIS — M50.30 DDD (DEGENERATIVE DISC DISEASE), CERVICAL: ICD-10-CM

## 2024-07-19 DIAGNOSIS — M54.2 CHRONIC NECK PAIN: ICD-10-CM

## 2024-07-19 DIAGNOSIS — M47.812 CERVICAL SPONDYLOSIS: ICD-10-CM

## 2024-07-19 DIAGNOSIS — M54.12 CERVICAL RADICULOPATHY: ICD-10-CM

## 2024-07-19 DIAGNOSIS — G89.29 CHRONIC NECK PAIN: ICD-10-CM

## 2024-07-19 DIAGNOSIS — M48.02 CERVICAL STENOSIS OF SPINE: ICD-10-CM

## 2024-07-19 PROCEDURE — 72141 MRI NECK SPINE W/O DYE: CPT

## 2024-07-24 ENCOUNTER — HOSPITAL ENCOUNTER (OUTPATIENT)
Dept: ULTRASOUND IMAGING | Age: 44
Discharge: HOME | End: 2024-07-26
Payer: COMMERCIAL

## 2024-07-24 DIAGNOSIS — E04.1 THYROID NODULE: ICD-10-CM

## 2024-07-24 PROCEDURE — 88173 CYTOPATH EVAL FNA REPORT: CPT

## 2024-07-24 PROCEDURE — 10005 FNA BX W/US GDN 1ST LES: CPT

## 2024-07-24 PROCEDURE — 88305 TISSUE EXAM BY PATHOLOGIST: CPT

## 2024-07-24 PROCEDURE — 88172 CYTP DX EVAL FNA 1ST EA SITE: CPT

## 2024-07-25 ENCOUNTER — OFFICE VISIT (OUTPATIENT)
Dept: NEUROSURGERY | Age: 44
End: 2024-07-25
Payer: COMMERCIAL

## 2024-07-25 VITALS
BODY MASS INDEX: 47.8 KG/M2 | SYSTOLIC BLOOD PRESSURE: 127 MMHG | TEMPERATURE: 97.2 F | DIASTOLIC BLOOD PRESSURE: 90 MMHG | WEIGHT: 280 LBS | HEART RATE: 93 BPM | OXYGEN SATURATION: 100 % | HEIGHT: 64 IN

## 2024-07-25 DIAGNOSIS — M54.12 CERVICAL RADICULOPATHY: Primary | ICD-10-CM

## 2024-07-25 PROCEDURE — 99204 OFFICE O/P NEW MOD 45 MIN: CPT | Performed by: PHYSICIAN ASSISTANT

## 2024-07-25 ASSESSMENT — ENCOUNTER SYMPTOMS
TROUBLE SWALLOWING: 0
BACK PAIN: 0
SHORTNESS OF BREATH: 0
PHOTOPHOBIA: 0
ABDOMINAL PAIN: 0

## 2024-07-25 NOTE — PROGRESS NOTES
[facility]      Subjective:  Prema Barney is a 44 year old female presenting to the office today as a new patient c/o neck pain with radiation down her arms, right greater than left. Describes the pain as a constant dull ache and intermittent sharp, shooting pain. Admits to right arm numbness/tingling. Symptoms have been present for 7 years and is worsening. She has tried ice/heat, Lyrica, and ibuprofen without significant lasting relief. She is about to start physical therapy and follows with pain management but has not had injections yet. Denies loss of bowel or bladder, saddle anesthesia, loss of dexterity, abnormal gait, fever, chills, N/V, SOB, or chest pain.        Review of Systems   Constitutional:  Negative for fever and unexpected weight change.   HENT:  Negative for trouble swallowing.    Eyes:  Negative for photophobia and visual disturbance.   Respiratory:  Negative for shortness of breath.    Cardiovascular:  Negative for chest pain.   Gastrointestinal:  Negative for abdominal pain.   Endocrine: Negative for heat intolerance.   Genitourinary:  Negative for flank pain.   Musculoskeletal:  Positive for neck pain and neck stiffness. Negative for back pain, gait problem and myalgias.   Skin:  Negative for wound.   Neurological:  Positive for weakness and numbness. Negative for headaches.   Psychiatric/Behavioral:  Negative for confusion.        Objective:  Vitals:    07/25/24 1106   BP: (!) 127/90   Pulse: 93   Temp: 97.2 °F (36.2 °C)   SpO2: 100%       Physical Exam  Constitutional:       Appearance: Normal appearance. She is well-developed.   HENT:      Head: Normocephalic and atraumatic.   Eyes:      Extraocular Movements: Extraocular movements intact.      Conjunctiva/sclera: Conjunctivae normal.      Pupils: Pupils are equal, round, and reactive to light.   Neck:      Comments: Pain with ROM and palpation   Cardiovascular:      Rate and Rhythm: Normal

## 2024-07-26 LAB — NON-GYN CYTOLOGY REPORT: NORMAL

## 2024-07-26 NOTE — RESULT ENCOUNTER NOTE
Please let patient know her thyroid biopsy did not show any signs of malignancy/cancer, it is a nodular goiter, a benign lesion

## 2024-07-30 ENCOUNTER — TELEPHONE (OUTPATIENT)
Dept: ADMINISTRATIVE | Age: 44
End: 2024-07-30

## 2024-07-30 DIAGNOSIS — E11.9 TYPE 2 DIABETES MELLITUS WITHOUT COMPLICATION, WITHOUT LONG-TERM CURRENT USE OF INSULIN (HCC): ICD-10-CM

## 2024-07-30 NOTE — TELEPHONE ENCOUNTER
I spoke with patient, scheduled 10/11 with Dr. Gutierrez    Electronically signed by Kristyn Troy MA on 7/30/24 at 1:41 PM EDT

## 2024-07-30 NOTE — TELEPHONE ENCOUNTER
Refill request    Metformin 500mg    [facility] @ [address]    Last Appointment   6/25/2024  Next Appointment  9/27/2024

## 2024-08-04 DIAGNOSIS — E78.5 HYPERLIPIDEMIA, UNSPECIFIED HYPERLIPIDEMIA TYPE: ICD-10-CM

## 2024-08-05 RX ORDER — ATORVASTATIN CALCIUM 20 MG/1
20 TABLET, FILM COATED ORAL DAILY
Qty: 30 TABLET | Refills: 0 | Status: SHIPPED | OUTPATIENT
Start: 2024-08-05

## 2024-08-07 ENCOUNTER — OFFICE VISIT (OUTPATIENT)
Age: 44
End: 2024-08-07
Payer: COMMERCIAL

## 2024-08-07 ENCOUNTER — HOSPITAL ENCOUNTER (OUTPATIENT)
Dept: PHYSICAL THERAPY | Age: 44
Setting detail: THERAPIES SERIES
Discharge: HOME | End: 2024-08-07
Attending: STUDENT IN AN ORGANIZED HEALTH CARE EDUCATION/TRAINING PROGRAM
Payer: COMMERCIAL

## 2024-08-07 VITALS
HEART RATE: 78 BPM | WEIGHT: 280 LBS | HEIGHT: 67 IN | OXYGEN SATURATION: 99 % | BODY MASS INDEX: 43.95 KG/M2 | DIASTOLIC BLOOD PRESSURE: 90 MMHG | RESPIRATION RATE: 16 BRPM | SYSTOLIC BLOOD PRESSURE: 145 MMHG

## 2024-08-07 DIAGNOSIS — M54.12 CERVICAL RADICULOPATHY: Primary | ICD-10-CM

## 2024-08-07 DIAGNOSIS — G89.29 CHRONIC NECK PAIN: ICD-10-CM

## 2024-08-07 DIAGNOSIS — M50.30 DDD (DEGENERATIVE DISC DISEASE), CERVICAL: ICD-10-CM

## 2024-08-07 DIAGNOSIS — M48.02 CERVICAL STENOSIS OF SPINE: ICD-10-CM

## 2024-08-07 DIAGNOSIS — M17.0 BILATERAL PRIMARY OSTEOARTHRITIS OF KNEE: ICD-10-CM

## 2024-08-07 DIAGNOSIS — M47.812 CERVICAL SPONDYLOSIS: ICD-10-CM

## 2024-08-07 DIAGNOSIS — M54.2 CHRONIC NECK PAIN: ICD-10-CM

## 2024-08-07 PROCEDURE — 97161 PT EVAL LOW COMPLEX 20 MIN: CPT | Performed by: PHYSICAL THERAPIST

## 2024-08-07 PROCEDURE — 99214 OFFICE O/P EST MOD 30 MIN: CPT | Performed by: STUDENT IN AN ORGANIZED HEALTH CARE EDUCATION/TRAINING PROGRAM

## 2024-08-07 RX ORDER — IBUPROFEN 200 MG
200 TABLET ORAL EVERY 6 HOURS PRN
COMMUNITY

## 2024-08-07 RX ORDER — CYCLOBENZAPRINE HCL 5 MG
5 TABLET ORAL NIGHTLY PRN
Qty: 30 TABLET | Refills: 0 | Status: SHIPPED | OUTPATIENT
Start: 2024-08-07 | End: 2024-09-06

## 2024-08-07 RX ORDER — SODIUM CHLORIDE 9 MG/ML
INJECTION, SOLUTION INTRAVENOUS PRN
OUTPATIENT
Start: 2024-08-07

## 2024-08-07 RX ORDER — SODIUM CHLORIDE 0.9 % (FLUSH) 0.9 %
5-40 SYRINGE (ML) INJECTION EVERY 12 HOURS SCHEDULED
OUTPATIENT
Start: 2024-08-07

## 2024-08-07 RX ORDER — SODIUM CHLORIDE 0.9 % (FLUSH) 0.9 %
5-40 SYRINGE (ML) INJECTION PRN
OUTPATIENT
Start: 2024-08-07

## 2024-08-07 ASSESSMENT — PAIN SCALES - GENERAL: PAINLEVEL_OUTOF10: 4

## 2024-08-07 ASSESSMENT — PAIN DESCRIPTION - PAIN TYPE: TYPE: CHRONIC PAIN

## 2024-08-07 ASSESSMENT — PAIN DESCRIPTION - LOCATION: LOCATION: NECK

## 2024-08-07 ASSESSMENT — PAIN DESCRIPTION - DESCRIPTORS: DESCRIPTORS: SHARP;ACHING

## 2024-08-07 NOTE — PROGRESS NOTES
Physical Therapy    Physical Therapy: Initial Evaluation    Patient: Prema Barney (44 y.o. female)   Examination Date: 2024  Plan of Care Certification Period: 2024 to        :  1980 ;    Confirmed: Yes MRN: [id number]  CSN: [id number]   Insurance: Payor: /   Insurance ID: No Subscriber Number on File Secondary Insurance (if applicable):    Referring Physician: Monika Sheth MD     PCP: Natty Pierce MD Visits to Date/Visits Approved: 1 /      No Show/Cancelled Appts:   /       Medical Diagnosis: Chronic neck pain [M54.2, G89.29]  Cervical spondylosis [M47.812]  Cervical radiculopathy [M54.12]  Cervical stenosis of spine [M48.02]  DDD (degenerative disc disease), cervical [M50.30]    Treatment Diagnosis:       PERTINENT MEDICAL HISTORY           Medical History: Chart Reviewed: Yes   Past Medical History:   Diagnosis Date    Anxiety     Depression     High cholesterol      Surgical History:   Past Surgical History:   Procedure Laterality Date    ARM SURGERY Left     CERVIX SURGERY      LITHOTRIPSY      2019    TUBAL LIGATION             Medications:   Current Outpatient Medications:     ibuprofen (ADVIL;MOTRIN) 200 MG tablet, Take 1 tablet by mouth every 6 hours as needed for Pain, Disp: , Rfl:     cyclobenzaprine (FLEXERIL) 5 MG tablet, Take 1 tablet by mouth nightly as needed for Muscle spasms, Disp: 30 tablet, Rfl: 0    atorvastatin (LIPITOR) 20 MG tablet, Take 1 tablet by mouth once daily, Disp: 30 tablet, Rfl: 0    metFORMIN (GLUCOPHAGE) 500 MG tablet, Take 1 tablet by mouth 2 times daily (with meals), Disp: 180 tablet, Rfl: 0    pregabalin (LYRICA) 25 MG capsule, qHS x 7 days then BID x 7 days then TID thereafter, Disp: 69 capsule, Rfl: 0  Allergies: Latex and Mushroom extract complex      SUBJECTIVE EXAMINATION      ,           Subjective History:    Subjective: Patient presents to PT with c/o cervical pain with radicular symptoms across BUEs. States symptoms are worse across R

## 2024-08-07 NOTE — PROGRESS NOTES
[facility] - Pain Medicine  [address] [place] [address]     Pain Medicine Follow Up Note      Prema Barney     Date of Visit:  8/7/2024    CC:  Patient presents for follow up   Chief Complaint   Patient presents with    Follow-up    Neck Pain    Knee Pain     Bilateral knee       HPI:    Pain is unchanged.  Medication side effects:? edema.   Recent interventional procedures:none..  Blood Thinners/Anticoagulation: no  Herbal Supplements: no  Pertinent Allergies: yes - Latex  Diabetic: no  Bowel/Bladder Incontinence: no    Previous Plan:  PT - started  MRI c spine - done  XR Knees - done  Lyrica - taking 25 mg BID  ? Having some foot swelling  Consider interventions  NSGY - seen, recommending LICO and PT    Interval Changes:  No sig changes in symptoms  Unclear foot swelling in AM  Wishes to consider interventions     Procedures:        Previous Treatments:    ibuprofen, lidocaine rub, gabapentin, Percocet, Flexeril, steroids, Wellbutrin, PT, chiropractic therapy, injections.     Potential Aberrant Drug-Related Behavior:  no      Imaging/Studies: New: no     XRAY:  XR Knees 7/24  IMPRESSION:  Moderate tricompartmental DJD with medial joint space narrowing right  slightly greater than left of involvement without acute fracture.      MRI:  MRI CERVICAL SPINE WO CONTRAST 07/19/2024     BONES/ALIGNMENT: There is normal alignment of the spine. The vertebral body  heights are maintained. The bone marrow signal appears unremarkable.    SPINAL CORD: No abnormal cord signal is seen.    SOFT TISSUES: No paraspinal mass identified.    C2-C3: There is no significant disc protrusion, spinal canal stenosis or  neural foraminal narrowing.    C3-C4: No significant disc herniation.  Mild uncovertebral joint hypertrophy.  No significant central canal or neural foraminal stenosis.    C4-C5: Disc desiccation with a small disc osteophyte complex.  Bilateral  uncovertebral joint hypertrophy.  Moderate central canal

## 2024-08-07 NOTE — PROGRESS NOTES
Prema Barney presents to the [facility] on 8/7/2024. Prema is complaining of pain neck and bilateral knee. The pain is constant. The pain is described as aching and sharp. Pain is rated on her best day at a 3, on her worst day at a 9, and on average at a 4 on the VAS scale. She took her last dose of Lyrica yesterday.     Any procedures since your last visit: No    Pacemaker or defibrillator: No .    She is  on NSAIDS and is not on anticoagulation medications  Medication Contract and Consent for Opioid Use Documents Filed        No documents found                    BP (!) 145/90   Pulse 78   Resp 16   Ht 1.702 m (5' 7\")   Wt 127 kg (280 lb)   SpO2 99%   BMI 43.85 kg/m²      No LMP recorded.

## 2024-08-09 ENCOUNTER — TELEPHONE (OUTPATIENT)
Dept: PAIN MANAGEMENT | Age: 44
End: 2024-08-09

## 2024-08-09 DIAGNOSIS — M54.16 LUMBAR RADICULOPATHY: ICD-10-CM

## 2024-08-09 DIAGNOSIS — M54.12 CERVICAL RADICULOPATHY: Primary | ICD-10-CM

## 2024-08-09 NOTE — TELEPHONE ENCOUNTER
Call to Prema Barney that procedure was scheduled for 8/16/2024 and that [facility] should call her a few days before for the pre op call and between 2:00 PM and 4:00 PM  the business day before with the arrival time. Instructed Prema to hold ibuprofen for 24 hours, Celebrex, Mobic, and naprosyn for 4 days and any aspirin containing products, CoQ 10, or fish oil for 7 days. Instructed to call office back if any questions. Prema verbalized understanding.    Electronically signed by Brendan Romano RN on 8/9/2024 at 10:57 AM

## 2024-08-13 NOTE — PROGRESS NOTES
[facility] PAIN MANAGEMENT  INSTRUCTIONS  ...........................................................................................................................................     [x] Parking the day of Surgery is located in the Main Entrance lot.  Upon entering the door, make immediate right into the surgery reception room    [x]  Bring photo ID and insurance card     [x] You may have a light breakfast day of procedure    [x]  Wear loose comfortable clothing    [x]  Please follow instructions for medications as given per Dr's office    [x] You can expect a call the business day prior to procedure to notify you of your arrival time     [x] Please arrange for     []  Other instructions

## 2024-08-14 ENCOUNTER — HOSPITAL ENCOUNTER (OUTPATIENT)
Dept: PHYSICAL THERAPY | Age: 44
Setting detail: THERAPIES SERIES
Discharge: HOME | End: 2024-08-14
Attending: STUDENT IN AN ORGANIZED HEALTH CARE EDUCATION/TRAINING PROGRAM
Payer: COMMERCIAL

## 2024-08-14 PROCEDURE — 9900000073 HC MANUAL THERAPY PER 15 MIN (SELF-PAY)

## 2024-08-14 PROCEDURE — 9900000068 HC ESTIM TX  UNATTENDED (SELF-PAY)

## 2024-08-14 NOTE — PROGRESS NOTES
[facility]  Phone: [phone] Fax: [phone]       Physical Therapy Daily Treatment Note  Date:  2024    Patient Name:  Prema Barney    :  1980  MRN: [id number]    Restrictions/Precautions:    Diagnosis: Chronic Neck Pain, Cervical Sponylosis, Cervical Radiculopathy   Treatment Diagnosis:    Insurance/Certification information: Self Pay   Referring Physician:  Dr. Sheth  Plan of care signed (Y/N):    Visit# / total visits:  2/6 weeks  Pain level: /10  Time In: 7:10       Time Out: 8:10         Subjective:  Patient c/o stiffness/soreness in post cervical region this morning.     Exercises:  Exercise/Equipment Resistance/Repetitions Other comments                                                                                                                                             Other Therapeutic Activities:      Home Exercise Program:  (24) Instructed pt in cervical rotation, shoulder shrugs and scapular retraction and to use ex to relieve static postures at work.     Manual Treatments: STM to cervical and upper trap muscles, gentle stretching and light traction, occipital release. (10 min)    Modalities: MHP to B cerv/upper trap muscles prior to stretching.  IFC and MHP x 20 min for pain control.      Timed Code Treatment Minutes:  45    Total Treatment Minutes:  60    Treatment/Activity Tolerance:  [x] Patient tolerated treatment well [] Patient limited by fatigue  [] Patient limited by pain  [] Patient limited by other medical complications  [x] Other: Pt reported feeling much better after treatment today.     Plan:   [x] Continue per plan of care [] Alter current plan (see comments)  [] Plan of care initiated [] Hold pending MD visit [] Discharge  Plan for Next Session:         Treatment Charges: Mins Units   Initial Evaluation     Re-Evaluation     Ther Exercise         TE 5 --   Manual Therapy     MT 10 1   Ther Activities        TA     Gait

## 2024-08-16 ENCOUNTER — HOSPITAL ENCOUNTER (OUTPATIENT)
Age: 44
Setting detail: OUTPATIENT SURGERY
Discharge: HOME | End: 2024-08-16
Attending: STUDENT IN AN ORGANIZED HEALTH CARE EDUCATION/TRAINING PROGRAM | Admitting: STUDENT IN AN ORGANIZED HEALTH CARE EDUCATION/TRAINING PROGRAM
Payer: COMMERCIAL

## 2024-08-16 ENCOUNTER — HOSPITAL ENCOUNTER (OUTPATIENT)
Dept: GENERAL RADIOLOGY | Age: 44
End: 2024-08-16
Attending: STUDENT IN AN ORGANIZED HEALTH CARE EDUCATION/TRAINING PROGRAM
Payer: COMMERCIAL

## 2024-08-16 VITALS
BODY MASS INDEX: 43.95 KG/M2 | DIASTOLIC BLOOD PRESSURE: 83 MMHG | WEIGHT: 280 LBS | TEMPERATURE: 98.2 F | SYSTOLIC BLOOD PRESSURE: 133 MMHG | OXYGEN SATURATION: 99 % | HEIGHT: 67 IN | RESPIRATION RATE: 17 BRPM | HEART RATE: 89 BPM

## 2024-08-16 DIAGNOSIS — R52 PAIN MANAGEMENT: ICD-10-CM

## 2024-08-16 PROBLEM — M54.12 CERVICAL RADICULOPATHY: Status: ACTIVE | Noted: 2024-08-16

## 2024-08-16 LAB
GLUCOSE BLD-MCNC: 134 MG/DL (ref 74–99)
HCG, URINE, POC: NEGATIVE
Lab: ABNORMAL
NEGATIVE QC PASS/FAIL: ABNORMAL
POSITIVE QC PASS/FAIL: ABNORMAL

## 2024-08-16 PROCEDURE — 2709999900 HC NON-CHARGEABLE SUPPLY: Performed by: STUDENT IN AN ORGANIZED HEALTH CARE EDUCATION/TRAINING PROGRAM

## 2024-08-16 PROCEDURE — 3600000002 HC SURGERY LEVEL 2 BASE: Performed by: STUDENT IN AN ORGANIZED HEALTH CARE EDUCATION/TRAINING PROGRAM

## 2024-08-16 PROCEDURE — 7100000010 HC PHASE II RECOVERY - FIRST 15 MIN: Performed by: STUDENT IN AN ORGANIZED HEALTH CARE EDUCATION/TRAINING PROGRAM

## 2024-08-16 PROCEDURE — 2500000003 HC RX 250 WO HCPCS: Performed by: STUDENT IN AN ORGANIZED HEALTH CARE EDUCATION/TRAINING PROGRAM

## 2024-08-16 PROCEDURE — 3600000012 HC SURGERY LEVEL 2 ADDTL 15MIN: Performed by: STUDENT IN AN ORGANIZED HEALTH CARE EDUCATION/TRAINING PROGRAM

## 2024-08-16 PROCEDURE — 6360000004 HC RX CONTRAST MEDICATION: Performed by: STUDENT IN AN ORGANIZED HEALTH CARE EDUCATION/TRAINING PROGRAM

## 2024-08-16 PROCEDURE — 6360000002 HC RX W HCPCS: Performed by: STUDENT IN AN ORGANIZED HEALTH CARE EDUCATION/TRAINING PROGRAM

## 2024-08-16 PROCEDURE — 82962 GLUCOSE BLOOD TEST: CPT

## 2024-08-16 RX ORDER — SODIUM CHLORIDE 9 MG/ML
INJECTION, SOLUTION INTRAVENOUS PRN
Status: DISCONTINUED | OUTPATIENT
Start: 2024-08-16 | End: 2024-08-16 | Stop reason: HOSPADM

## 2024-08-16 RX ORDER — SODIUM CHLORIDE 0.9 % (FLUSH) 0.9 %
5-40 SYRINGE (ML) INJECTION EVERY 12 HOURS SCHEDULED
Status: DISCONTINUED | OUTPATIENT
Start: 2024-08-16 | End: 2024-08-16 | Stop reason: HOSPADM

## 2024-08-16 RX ORDER — DEXAMETHASONE SODIUM PHOSPHATE 10 MG/ML
INJECTION, SOLUTION INTRAMUSCULAR; INTRAVENOUS PRN
Status: DISCONTINUED | OUTPATIENT
Start: 2024-08-16 | End: 2024-08-16 | Stop reason: ALTCHOICE

## 2024-08-16 RX ORDER — IOPAMIDOL 612 MG/ML
INJECTION, SOLUTION INTRATHECAL PRN
Status: DISCONTINUED | OUTPATIENT
Start: 2024-08-16 | End: 2024-08-16 | Stop reason: ALTCHOICE

## 2024-08-16 RX ORDER — LIDOCAINE HYDROCHLORIDE 5 MG/ML
INJECTION, SOLUTION INFILTRATION; INTRAVENOUS PRN
Status: DISCONTINUED | OUTPATIENT
Start: 2024-08-16 | End: 2024-08-16 | Stop reason: ALTCHOICE

## 2024-08-16 RX ORDER — SODIUM CHLORIDE 0.9 % (FLUSH) 0.9 %
5-40 SYRINGE (ML) INJECTION PRN
Status: DISCONTINUED | OUTPATIENT
Start: 2024-08-16 | End: 2024-08-16 | Stop reason: HOSPADM

## 2024-08-16 ASSESSMENT — PAIN DESCRIPTION - DESCRIPTORS
DESCRIPTORS: ACHING
DESCRIPTORS: DISCOMFORT

## 2024-08-16 ASSESSMENT — PAIN DESCRIPTION - LOCATION: LOCATION: BACK

## 2024-08-16 ASSESSMENT — PAIN - FUNCTIONAL ASSESSMENT: PAIN_FUNCTIONAL_ASSESSMENT: 0-10

## 2024-08-16 ASSESSMENT — PAIN SCALES - GENERAL
PAINLEVEL_OUTOF10: 2
PAINLEVEL_OUTOF10: 2

## 2024-08-16 NOTE — OP NOTE
2024    Patient: Prema Barney  :  1980  Age:  44 y.o.  Sex:  female     PRE-PROCEDURE DIAGNOSIS: Cervical degenerative disc disease, cervical radicular pain.    POST-PROCEDURE DIAGNOSIS: Same.    PROCEDURE: Cervical epidural steroid injection at  C7/T1 level under fluroscopic guidance,  #1     SURGEON:  Monika Sheth MD    ANESTHESIA: Local    ESTIMATED BLOOD LOSS: None.  ______________________________________________________________________  BRIEF HISTORY:  Prema Barney comes in today for the first  therapeutic cervical epidural injection under fluoroscopic guidance. The potential complications of this procedure were discussed with the her again today.  She has elected to undergo the aforementioned procedure.    Prema’s complete History & Physical examination were reviewed in depth, a copy of which is in the chart.      DESCRIPTION OF PROCEDURE:    After confirming written and informed consent, a time-out was performed and Prema’s name and date of birth, the procedure to be performed as well as the plan for the location of the needle insertion were confirmed.    The patient was brought into the procedure room and placed in the prone position with the head flexed midline on the fluoroscopy table. A pillow was placed under the patient's head to increase cervical interlaminar space. Standard monitors were placed, and vital signs were observed throughout the procedure. The area was prepped with chloraprep and the C7/T1 interspace was marked under fluoroscopy. The skin and subcutaneous tissues at the above level were anesthestized with 0.5% lidocaine. An # 18 gauge 3 1/2 tuohy epidural needle was inserted and advanced toward the inferior lamina until bony contact was made. The needle was then advanced superiorly toward epidural space .   From this point on hanging drop/loss of resistance technique with 5 cc glass syringe was used to confirm entrance of the needle into the epidural space under

## 2024-08-16 NOTE — DISCHARGE INSTRUCTIONS
[facility] Pain Management Department  [place] [facility]-[phone]  Dr. Ras Madden   Post-Pain Block/Radiofrequency  Home Going Instructions    1-Go home, rest for the remainder of the day  2-Please do not lift over 20 pounds the day of the injection  3-If you received sedation No: alcohol, driving, operating lawn mowers, plows, tractors or other dangerous equipment until next morning. Do not make important decisions or sign legal documents for 24 hours. You may experience light headedness, dizziness, nausea or sleepiness after sedation. Do not stay alone. A responsible adult must be with you for 24 hours. You could be nauseated from the medications you have received. Your IV site may be sore and bruised.    4-No dietary restrictions     5-Resume all medications the same day, blood thinners to be resumed 24 hours after injection if you were instructed to stop any.    6-Keep the surgical site clean and dry, you may shower the next morning and remove the      dressing.     7- No sitz baths, tub baths or hot tubs/swimming for 24 hours.       8- If you have any pain at the injection site(s), application of an ice pack to the area should be       helpful, 20 minutes on/20 minutes off for next 48 hours.  9- Call [facility] Pain Management immediately at if you develop.  Fever greater than 100.4 F  Have bleeding or drainage from the puncture site  Have progressive Leg/arm numbness and or weakness  Loss of control of bowel and or bladder (wet/soil yourself)  Severe headache with inability to lift head  10-You may return to work the next day

## 2024-08-16 NOTE — H&P
vomiting, change in bowel habits, diarrhea, constipation and abdominal pain    MUSCULOSKELETAL: negative for muscle weakness    SKIN: negative for itching or rashes.    BEHAVIOR/PSYCH:  negative for poor appetite, increased appetite, decreased sleep and poor concentration    All other systems negative      PHYSICAL EXAM:    VITALS:  BP (!) 158/96   Pulse (!) 106   Temp 98.2 °F (36.8 °C) (Temporal)   Resp 18   Ht 1.702 m (5' 7\")   Wt 127 kg (280 lb)   LMP 07/23/2024   SpO2 97%   BMI 43.85 kg/m²     CONSTITUTIONAL:  awake, alert, cooperative, no apparent distress, and appears stated age    EYES: PERRLA, EOMI    LUNGS:  No increased work of breathing, no audible wheezing    CARDIOVASCULAR:  regular rate and rhythm    ABDOMEN:  Soft non tender non distended     EXTREMITIES: no signs of clubbing or cyanosis.    MUSCULOSKELETAL: negative for flaccid muscle tone or spastic movements.    SKIN: gross examination reveals no signs of rashes, or diaphoresis.    NEURO: Cranial nerves II-XII grossly intact. No signs of agitated mood.       Assessment/Plan:  Chronic neck pain, UE  pain for C7/T1 LICO

## 2024-08-19 ENCOUNTER — HOSPITAL ENCOUNTER (OUTPATIENT)
Dept: PHYSICAL THERAPY | Age: 44
Setting detail: THERAPIES SERIES
Discharge: HOME | End: 2024-08-19
Attending: STUDENT IN AN ORGANIZED HEALTH CARE EDUCATION/TRAINING PROGRAM
Payer: COMMERCIAL

## 2024-08-19 NOTE — FLOWSHEET NOTE
[facility]  Phone: [phone] Fax: [phone]     Physical Therapy  Cancellation/No-show Note  Patient Name:  Prema Barney  :  1980   Date:  2024    For today's appointment patient:  [x]  Cancelled  []  Rescheduled appointment  []  No-show     Reason given by patient:  [x]  Patient ill  []  Conflicting appointment  []  No transportation    []  Conflict with work  []  No reason given  []  Other:     Comments:      Electronically signed by:  Lyla Gong,LPTA  [id number]

## 2024-08-21 ENCOUNTER — HOSPITAL ENCOUNTER (OUTPATIENT)
Dept: PHYSICAL THERAPY | Age: 44
Setting detail: THERAPIES SERIES
Discharge: HOME | End: 2024-08-21
Attending: STUDENT IN AN ORGANIZED HEALTH CARE EDUCATION/TRAINING PROGRAM
Payer: COMMERCIAL

## 2024-08-21 PROCEDURE — 97110 THERAPEUTIC EXERCISES: CPT

## 2024-08-21 NOTE — PROGRESS NOTES
[facility]  Phone: [phone] Fax: [phone]       Physical Therapy Daily Treatment Note  Date:  2024    Patient Name:  Prema Barney    :  1980  MRN: [id number]    Restrictions/Precautions:    Diagnosis: Chronic Neck Pain, Cervical Sponylosis, Cervical Radiculopathy   Treatment Diagnosis:    Insurance/Certification information: Self Pay   Referring Physician:  Dr. Sheth  Plan of care signed (Y/N):    Visit# / total visits:  3/6 weeks  Pain level: /10  Time In: 7:30      Time Out: 8:10         Subjective:  Patient reports good pain relief from injection she had last week. Primary c/o this morning is stiffness in cervical region.     Exercises:  Exercise/Equipment Resistance/Repetitions Other comments    UBE  nt    Cervical rotation 3\" 5 x B    Upper trap stretch 10\" 5 x B    Levator stretch 10\" 5 x B    Doorway stretch 10\" 5 x    Thoracic stretch 10\" 5 x    Shoulder shrugs 10 x    Scapular retraction 10 x                                                                                      Other Therapeutic Activities:      Home Exercise Program:  (24) Instructed pt in cervical rotation, shoulder shrugs and scapular retraction and to use ex to relieve static postures at work.   (24) Outlined written HEP (above noted) and reviewed with patient. Good return demonstration.    Manual Treatments:   Modalities: MHP to B cerv/upper trap muscles prior to stretching.  .  NT per pt    Timed Code Treatment Minutes:  35    Total Treatment Minutes:  40    Treatment/Activity Tolerance:  [x] Patient tolerated treatment well [] Patient limited by fatigue  [] Patient limited by pain  [] Patient limited by other medical complications  [x] Other: Pt tolerated initiation of cerv ROM,gentle stretching ex with no pain reported.    Plan:   [x] Continue per plan of care [] Alter current plan (see comments)  [] Plan of care initiated [] Hold pending MD visit [] Discharge  Plan

## 2024-08-26 ENCOUNTER — HOSPITAL ENCOUNTER (OUTPATIENT)
Dept: PHYSICAL THERAPY | Age: 44
Setting detail: THERAPIES SERIES
Discharge: HOME | End: 2024-08-26
Attending: STUDENT IN AN ORGANIZED HEALTH CARE EDUCATION/TRAINING PROGRAM
Payer: COMMERCIAL

## 2024-08-26 NOTE — FLOWSHEET NOTE
[facility]  Phone: [phone] Fax: [phone]     Physical Therapy  Cancellation/No-show Note  Patient Name:  Prema Barney  :  1980   Date:  2024    For today's appointment patient:  [x]  Cancelled  []  Rescheduled appointment  []  No-show     Reason given by patient:  []  Patient ill  []  Conflicting appointment  []  No transportation    []  Conflict with work  [x]  No reason given  []  Other:     Comments:  Pt left a voice message cancelling both appointments for this week.   Called patient who reported she twisted her ankle and \"over did it in the sun\". Scheduled for next week.     Electronically signed by:  Lyla Gong,DIRK  [id number]

## 2024-08-28 ENCOUNTER — APPOINTMENT (OUTPATIENT)
Dept: PHYSICAL THERAPY | Age: 44
End: 2024-08-28
Attending: STUDENT IN AN ORGANIZED HEALTH CARE EDUCATION/TRAINING PROGRAM
Payer: COMMERCIAL

## 2024-08-31 DIAGNOSIS — E78.5 HYPERLIPIDEMIA, UNSPECIFIED HYPERLIPIDEMIA TYPE: ICD-10-CM

## 2024-09-03 RX ORDER — ATORVASTATIN CALCIUM 20 MG/1
20 TABLET, FILM COATED ORAL DAILY
Qty: 30 TABLET | Refills: 0 | Status: SHIPPED | OUTPATIENT
Start: 2024-09-03

## 2024-09-04 ENCOUNTER — HOSPITAL ENCOUNTER (OUTPATIENT)
Dept: PHYSICAL THERAPY | Age: 44
Setting detail: THERAPIES SERIES
Discharge: HOME | End: 2024-09-04
Attending: STUDENT IN AN ORGANIZED HEALTH CARE EDUCATION/TRAINING PROGRAM
Payer: COMMERCIAL

## 2024-09-04 PROCEDURE — 9900000067 HC THERAPEUTIC EXERCISE EA 15 MINS (SELF-PAY)

## 2024-09-04 PROCEDURE — 97110 THERAPEUTIC EXERCISES: CPT

## 2024-09-04 NOTE — PROGRESS NOTES
[facility]  Phone: [phone] Fax: [phone]       Physical Therapy Daily Treatment Note  Date:  2024    Patient Name:  Prema Barney    :  1980  MRN: [id number]    Restrictions/Precautions:  Latex Allergy  Diagnosis: Chronic Neck Pain, Cervical Sponylosis, Cervical Radiculopathy   Treatment Diagnosis:    Insurance/Certification information: Self Pay   Referring Physician:  Dr. Sheth  Plan of care signed (Y/N):    Visit# / total visits:  4/6 weeks  Pain level: /10  Time In: 7:30      Time Out: 8:00     Subjective:  Patient reports she is still feeling good after injection. She reports occasional brief pain that resolves quickly.     Exercises:  Exercise/Equipment Resistance/Repetitions Other comments    UBE  4 min Fwd/bckwd   Cervical rotation 3\" 5 x B    Upper trap stretch 10\" 5 x B    Levator stretch 10\" 5 x B    Doorway stretch 10\" 5 x    Thoracic stretch 10\" 5 x    Shoulder shrugs 2# 10 x    Scapular retraction 2# 10 x                                                                                      Other Therapeutic Activities:      Home Exercise Program:  (24) Instructed pt in cervical rotation, shoulder shrugs and scapular retraction and to use ex to relieve static postures at work.   (24) Outlined written HEP (above noted) and reviewed with patient. Good return demonstration.    Manual Treatments:   Modalities: NT per pt  .     Timed Code Treatment Minutes:  30    Total Treatment Minutes:  30    Treatment/Activity Tolerance:  [x] Patient tolerated treatment well [] Patient limited by fatigue  [] Patient limited by pain  [] Patient limited by other medical complications  [x] Other: Pt demonstrates good knowledge of her exercises.  Good tolerance for progression to light strengthening.   She has latex free resistive band she will bring to next session.     Plan:   [x] Continue per plan of care [] Alter current plan (see comments)  [] Plan of

## 2024-09-11 ENCOUNTER — HOSPITAL ENCOUNTER (OUTPATIENT)
Dept: PHYSICAL THERAPY | Age: 44
Setting detail: THERAPIES SERIES
Discharge: HOME | End: 2024-09-11
Attending: STUDENT IN AN ORGANIZED HEALTH CARE EDUCATION/TRAINING PROGRAM
Payer: COMMERCIAL

## 2024-09-11 PROCEDURE — 97110 THERAPEUTIC EXERCISES: CPT

## 2024-09-18 ENCOUNTER — OFFICE VISIT (OUTPATIENT)
Age: 44
End: 2024-09-18
Payer: COMMERCIAL

## 2024-09-18 VITALS
HEIGHT: 66 IN | BODY MASS INDEX: 44.52 KG/M2 | SYSTOLIC BLOOD PRESSURE: 128 MMHG | DIASTOLIC BLOOD PRESSURE: 86 MMHG | WEIGHT: 277 LBS

## 2024-09-18 DIAGNOSIS — M48.02 CERVICAL STENOSIS OF SPINE: ICD-10-CM

## 2024-09-18 DIAGNOSIS — M54.12 CERVICAL RADICULOPATHY: ICD-10-CM

## 2024-09-18 DIAGNOSIS — M54.16 LUMBAR RADICULOPATHY: ICD-10-CM

## 2024-09-18 DIAGNOSIS — M50.30 DDD (DEGENERATIVE DISC DISEASE), CERVICAL: ICD-10-CM

## 2024-09-18 DIAGNOSIS — M47.812 CERVICAL SPONDYLOSIS: ICD-10-CM

## 2024-09-18 DIAGNOSIS — M17.0 BILATERAL PRIMARY OSTEOARTHRITIS OF KNEE: Primary | ICD-10-CM

## 2024-09-18 PROCEDURE — 99213 OFFICE O/P EST LOW 20 MIN: CPT | Performed by: STUDENT IN AN ORGANIZED HEALTH CARE EDUCATION/TRAINING PROGRAM

## 2024-09-28 DIAGNOSIS — E78.5 HYPERLIPIDEMIA, UNSPECIFIED HYPERLIPIDEMIA TYPE: ICD-10-CM

## 2024-09-30 RX ORDER — ATORVASTATIN CALCIUM 20 MG/1
20 TABLET, FILM COATED ORAL DAILY
Qty: 30 TABLET | Refills: 0 | Status: SHIPPED
Start: 2024-09-30 | End: 2024-10-04 | Stop reason: SDUPTHER

## 2024-10-01 ENCOUNTER — PROCEDURE VISIT (OUTPATIENT)
Dept: PAIN MANAGEMENT | Age: 44
End: 2024-10-01
Payer: COMMERCIAL

## 2024-10-01 VITALS
TEMPERATURE: 97.3 F | BODY MASS INDEX: 44.52 KG/M2 | RESPIRATION RATE: 16 BRPM | WEIGHT: 277 LBS | DIASTOLIC BLOOD PRESSURE: 89 MMHG | HEIGHT: 66 IN | HEART RATE: 96 BPM | SYSTOLIC BLOOD PRESSURE: 139 MMHG | OXYGEN SATURATION: 96 %

## 2024-10-01 DIAGNOSIS — M17.0 BILATERAL PRIMARY OSTEOARTHRITIS OF KNEE: Primary | ICD-10-CM

## 2024-10-01 PROCEDURE — 20610 DRAIN/INJ JOINT/BURSA W/O US: CPT

## 2024-10-01 PROCEDURE — 20610 DRAIN/INJ JOINT/BURSA W/O US: CPT | Performed by: STUDENT IN AN ORGANIZED HEALTH CARE EDUCATION/TRAINING PROGRAM

## 2024-10-01 RX ORDER — METHYLPREDNISOLONE ACETATE 40 MG/ML
40 INJECTION, SUSPENSION INTRA-ARTICULAR; INTRALESIONAL; INTRAMUSCULAR; SOFT TISSUE ONCE
Status: COMPLETED | OUTPATIENT
Start: 2024-10-01 | End: 2024-10-01

## 2024-10-01 RX ORDER — LIDOCAINE HYDROCHLORIDE 20 MG/ML
2 INJECTION, SOLUTION INFILTRATION; PERINEURAL ONCE
Status: COMPLETED | OUTPATIENT
Start: 2024-10-01 | End: 2024-10-01

## 2024-10-01 RX ORDER — BUPIVACAINE HYDROCHLORIDE 2.5 MG/ML
8 INJECTION, SOLUTION EPIDURAL; INFILTRATION; INTRACAUDAL ONCE
Status: COMPLETED | OUTPATIENT
Start: 2024-10-01 | End: 2024-10-01

## 2024-10-01 RX ADMIN — LIDOCAINE HYDROCHLORIDE 2 ML: 20 INJECTION, SOLUTION INFILTRATION; PERINEURAL at 16:03

## 2024-10-01 RX ADMIN — METHYLPREDNISOLONE ACETATE 40 MG: 40 INJECTION, SUSPENSION INTRA-ARTICULAR; INTRALESIONAL; INTRAMUSCULAR; INTRASYNOVIAL; SOFT TISSUE at 16:04

## 2024-10-01 RX ADMIN — BUPIVACAINE HYDROCHLORIDE 20 MG: 2.5 INJECTION, SOLUTION EPIDURAL; INFILTRATION; INTRACAUDAL at 16:02

## 2024-10-01 NOTE — PROGRESS NOTES
10/1/2024    Chief Complaint   Patient presents with    Injections     Bilateral knee cortisone steroid injection       Allergies as of 10/01/2024 - Fully Reviewed 10/01/2024   Allergen Reaction Noted    Latex Hives and Rash 02/19/2015    Mushroom extract complex Anaphylaxis and Nausea And Vomiting 02/23/2022        Procedure: Bilateral knee cortisone steroid injection     Y  consent signed Y  procedure verified with patient  Y  allergies noted Y  pt confirms not pregnant    Patient rates pain a 5/10 on the VAS scale.    Skin Prep:  ChloraPrep    Anesthetic:  n/a    Medication:  Marcaine 0.25% and DepMedrol    Vitals:    10/01/24 1504   BP: 139/89   Pulse: 96   Resp: 16   Temp: 97.3 °F (36.3 °C)   TempSrc: Infrared   SpO2: 96%   Weight: 125.6 kg (277 lb)   Height: 1.676 m (5' 6\")       I witnessed patient signing consent to Medical Procedure and Treatment form.     Aristides Robles RN

## 2024-10-01 NOTE — PROGRESS NOTES
10/1/2024    Patient: Prema Barney  :  1980  Age:  44 y.o.  Sex:  female     PRE-OPERATIVE DIAGNOSIS: Bilateral  Knee pain, osteoarthritis.    POST-OPERATIVE DIAGNOSIS: Same.    PROCEDURE PERFORMED:  Bilateral knee injection.    SURGEON:  Monika Sheth MD    ANESTHESIA: Local    ESTIMATED BLOOD LOSS: None.    BRIEF HISTORY: Prema Barney comes in today for Bilateral  knee injection. . After discussing the potential risks and benefits of the procedure with the patient.  Prema did request that we proceed. A complete History & Physical was reviewed and it is unchanged.    DESCRIPTION OF PROCEDURE:   The patient was placed in a seated position. The area of the Right knee was prepped with chloraprep and draped in a sterile manner. The overlying skin and subcutaneous tissues were anesthetized with  2% Lidocaine. Then the targeted area of the patellar tendon was palpated and marked.    A 27 gauge 1 1/2 inch needle was advanced into the joint capsule. Confirmation of needle placement was obtained by direct visualization of synovial fluid by aspiration. A solution of 0.25 % Bupivacaine 5 cc and 40 mg DepoMedrol was injected easily without complications. The needle was then removed and Band-Aid applied.    The area of the Left knee was prepped with chloraprep and draped in a sterile manner. The overlying skin and subcutaneous tissues were anesthetized with  2% Lidocaine. Then the targeted area of the patellar tendon was palpated and marked.    A 27 gauge 1 1/2 inch needle was advanced into the joint capsule. Confirmation of needle placement was obtained by direct visualization of synovial fluid by aspiration. A solution of 0.25 % Bupivacaine 5 cc and 40 mg DepoMedrol was injected easily without complications. The needle was then removed and Band-Aid applied.    Disposition the patient tolerated the procedure well and there were no complications .     Perma will follow up in our comprehensive Pain  The procedure was performed independently The procedure was performed independently

## 2024-10-04 ENCOUNTER — OFFICE VISIT (OUTPATIENT)
Dept: FAMILY MEDICINE CLINIC | Age: 44
End: 2024-10-04

## 2024-10-04 VITALS
TEMPERATURE: 98.2 F | HEIGHT: 66 IN | DIASTOLIC BLOOD PRESSURE: 85 MMHG | WEIGHT: 276 LBS | RESPIRATION RATE: 16 BRPM | SYSTOLIC BLOOD PRESSURE: 139 MMHG | OXYGEN SATURATION: 98 % | HEART RATE: 89 BPM | BODY MASS INDEX: 44.36 KG/M2

## 2024-10-04 DIAGNOSIS — Z12.12 ENCOUNTER FOR COLORECTAL CANCER SCREENING USING COLOGUARD TEST: ICD-10-CM

## 2024-10-04 DIAGNOSIS — Z80.9 FAMILY HISTORY OF CANCER: ICD-10-CM

## 2024-10-04 DIAGNOSIS — R91.1 LUNG NODULE SEEN ON IMAGING STUDY: ICD-10-CM

## 2024-10-04 DIAGNOSIS — Z12.11 ENCOUNTER FOR COLORECTAL CANCER SCREENING USING COLOGUARD TEST: ICD-10-CM

## 2024-10-04 DIAGNOSIS — E04.1 THYROID NODULE: ICD-10-CM

## 2024-10-04 DIAGNOSIS — E78.5 HYPERLIPIDEMIA, UNSPECIFIED HYPERLIPIDEMIA TYPE: ICD-10-CM

## 2024-10-04 DIAGNOSIS — E11.9 TYPE 2 DIABETES MELLITUS WITHOUT COMPLICATION, WITHOUT LONG-TERM CURRENT USE OF INSULIN (HCC): Primary | ICD-10-CM

## 2024-10-04 LAB
CREATININE URINE POCT: NORMAL
HBA1C MFR BLD: 6.7 %
MICROALBUMIN/CREAT 24H UR: NORMAL MG/DL
MICROALBUMIN/CREAT UR-RTO: NORMAL MG/G

## 2024-10-04 RX ORDER — ATORVASTATIN CALCIUM 20 MG/1
20 TABLET, FILM COATED ORAL DAILY
Qty: 30 TABLET | Refills: 5 | Status: SHIPPED | OUTPATIENT
Start: 2024-10-04

## 2024-10-04 NOTE — PROGRESS NOTES
[facility]  [facility]    Subjective:  Type 2 Diabetes  A1c 6.7    Thyroid nodule  FNA and referral to ENT  Normal work up thus far    Incidental lung nodule  Due for 6 month f/u now since she is previous smoker    Mammogram and pap up to date and normal per pt    Cologuard preferred by pt    Declines vaccine      ROS otherwise negative     Past medical, surgical, family and social history were reviewed, non-contributory, and unchanged unless otherwise stated.    Objective:    /85   Pulse 89   Temp 98.2 °F (36.8 °C) (Temporal)   Resp 16   Ht 1.676 m (5' 6\")   Wt 125.2 kg (276 lb)   SpO2 98%   BMI 44.55 kg/m²     Exam is as noted by resident with the following changes, additions or corrections:        Assessment/Plan:  Type 2 diabetes - continue metformin  Lung nodule - CT scan due, ordered  Thyroid nodule - FNA just done, await path and ENT f/u  HM - cologuard, get mammo report  Fam hx cancer - referral to genetics       Attending Physician Statement  I have reviewed the chart, including any radiology or labs. I have discussed the case, including pertinent history and exam findings with the resident.  I agree with the assessment, plan and orders as documented by the resident.  Please refer to the resident note for additional information.      Electronically signed by ANASTASIIA NAVARRETE MD on 10/4/2024 at 9:34 AM  
warm.   Neurological:      General: No focal deficit present.      Mental Status: She is alert and oriented to person, place, and time.   Psychiatric:         Mood and Affect: Mood normal.           ASSESSMENT AND PLAN     1. Type 2 diabetes mellitus without complication, without long-term current use of insulin (HCC)  A1c improving, continue meds.   - metFORMIN (GLUCOPHAGE) 500 MG tablet; Take 1 tablet by mouth 2 times daily (with meals)  Dispense: 180 tablet; Refill: 1  - POCT glycosylated hemoglobin (Hb A1C)  - POCT microalbumin    2. Thyroid nodule  FNAc report pending, Ent will see on  10/11    3. Hyperlipidemia, unspecified hyperlipidemia type  Continue meds  - atorvastatin (LIPITOR) 20 MG tablet; Take 1 tablet by mouth daily  Dispense: 30 tablet; Refill: 5    4. Encounter for colorectal cancer screening using Cologuard test  FHX of colon cancers, options given to h er nd pt opted for cologuard at this time.   - Cologuard (Fecal DNA Colorectal Cancer Screening)    5. Family history of cancer  Has multiple family members with different kind of cancers, refer to genetics.   - External Referral To Genetics    6. Lung nodule seen on imaging study  Ct soft tissue 6 months ago mentioned a nodule in lung , FU in 6-12 months.   - will do Ct now.   - quit smoking in 2000, ws doing 0.5 PPD  - CT CHEST WO CONTRAST; Future      Counseled regarding above diagnosis, including possible risks and complications, especially if left uncontrolled. Counseled regarding the possible side effects, risks, benefits and alternatives to treatment; patient and/or guardian verbalizes understanding, agrees, feels comfortable with and wishes to proceed with above treatment plan.    Call or go to ED immediately if symptoms worsen or persist. Advised patient to call with any new medication issues, and, as applicable, read all Rx info from pharmacy to assure aware of all possible risks and side effects of medication before taking.     Patient

## 2024-10-07 ENCOUNTER — OFFICE VISIT (OUTPATIENT)
Dept: FAMILY MEDICINE CLINIC | Age: 44
End: 2024-10-07
Payer: COMMERCIAL

## 2024-10-07 VITALS
OXYGEN SATURATION: 98 % | WEIGHT: 273 LBS | HEIGHT: 66 IN | SYSTOLIC BLOOD PRESSURE: 134 MMHG | RESPIRATION RATE: 16 BRPM | HEART RATE: 94 BPM | DIASTOLIC BLOOD PRESSURE: 96 MMHG | TEMPERATURE: 98 F | BODY MASS INDEX: 43.87 KG/M2

## 2024-10-07 DIAGNOSIS — M25.551 RIGHT HIP PAIN: Primary | ICD-10-CM

## 2024-10-07 PROCEDURE — 99213 OFFICE O/P EST LOW 20 MIN: CPT

## 2024-10-07 NOTE — PROGRESS NOTES
S: 44 y.o. female with   Chief Complaint   Patient presents with    Groin Pain    Hip Pain     Left for about 3 weeks. Hard to move laterally     Other     Declined flu shot       L groin/proximal thigh pain, worse with movement.  Improving and assisted with heat.     O: VS:  height is 1.676 m (5' 5.98\") and weight is 123.8 kg (273 lb). Her temporal temperature is 98 °F (36.7 °C). Her blood pressure is 134/96 (abnormal) and her pulse is 94. Her respiration is 16 and oxygen saturation is 98%.   BP Readings from Last 3 Encounters:   10/07/24 (!) 134/96   10/04/24 139/85   10/01/24 139/89     See resident note  Pain with abduction LLE  TTP pes/GTB/SI bilaterally     Impression/Plan:   1) MSK pains likely groin strain - stretches provided - refer to PT if not improving           Health Maintenance Due   Topic Date Due    Pneumococcal 0-64 years Vaccine (1 of 2 - PCV) Never done    Diabetic foot exam  Never done    Diabetic retinal exam  Never done    Hepatitis B vaccine (1 of 3 - 19+ 3-dose series) Never done    DTaP/Tdap/Td vaccine (1 - Tdap) Never done    Cervical cancer screen  Never done    Breast cancer screen  Never done    Colorectal Cancer Screen  Never done    Flu vaccine (1) Never done    COVID-19 Vaccine (1 - 2023-24 season) Never done         Attending Physician Statement  I have discussed the case, including pertinent history and exam findings with the resident. I also have seen the patient and performed key portions of the examination.  I agree with the documented assessment and plan.      Aristides Downey MD

## 2024-10-07 NOTE — PROGRESS NOTES
[facility]  Department of Family Medicine  Family Medicine Residency Program      Patient: Prema Barney 44 y.o. female     Date of Service: 10/7/24      Chief complaint:   Chief Complaint   Patient presents with    Groin Pain    Hip Pain     Left for about 3 weeks. Hard to move laterally     Other     Declined flu shot       HISTORY OF PRESENTING ILLNESS     44 y.o. female presented to the clinic      Left Groin pain:  - 2 wks.   - was getting better but when she went up the stairs, got worse.   - continuous ---> intermittent now.    -denies fall, trauma, numbness, tingling. [  - hx of hip dysplasia , .   - short distance walk is fine, long walks 30 ft starts to have pain/   - advil not helped.   -heating and physical stretches helped.   - worse with going up on stairs, side stepping.   - denies swelling, rash       Health Maintenance:  Health Maintenance Due   Topic Date Due    Pneumococcal 0-64 years Vaccine (1 of 2 - PCV) Never done    Diabetic foot exam  Never done    Diabetic retinal exam  Never done    Hepatitis B vaccine (1 of 3 - 19+ 3-dose series) Never done    DTaP/Tdap/Td vaccine (1 - Tdap) Never done    Cervical cancer screen  Never done    Breast cancer screen  Never done    Colorectal Cancer Screen  Never done    Flu vaccine (1) Never done    COVID-19 Vaccine (1 - 2023-24 season) Never done     Past Medical History:      Diagnosis Date    Anxiety     Depression     Diabetes mellitus (HCC)     pre diabetes    High cholesterol      Past Surgical History:        Procedure Laterality Date    ARM SURGERY Left     CERVIX SURGERY      LITHOTRIPSY      2019    PAIN MANAGEMENT PROCEDURE N/A 8/16/2024    CERVICAL 7-THORACIC 1 EPIDURAL STEROID INJECTION UNDER FLUOROSCOPIC GUIDANCE performed by Monika Sheth MD at [facility] OR    TUBAL LIGATION      2002     Allergies:    Latex and Mushroom extract complex  Social History:   Social History     Socioeconomic History    Marital status:

## 2024-10-11 ENCOUNTER — OFFICE VISIT (OUTPATIENT)
Dept: ENT CLINIC | Age: 44
End: 2024-10-11
Payer: COMMERCIAL

## 2024-10-11 VITALS
HEIGHT: 66 IN | BODY MASS INDEX: 43.54 KG/M2 | SYSTOLIC BLOOD PRESSURE: 129 MMHG | DIASTOLIC BLOOD PRESSURE: 87 MMHG | HEART RATE: 83 BPM | TEMPERATURE: 97.3 F | OXYGEN SATURATION: 98 % | WEIGHT: 270.9 LBS

## 2024-10-11 DIAGNOSIS — E04.1 THYROID NODULE: Primary | ICD-10-CM

## 2024-10-11 PROCEDURE — 99203 OFFICE O/P NEW LOW 30 MIN: CPT | Performed by: OTOLARYNGOLOGY

## 2024-10-11 RX ORDER — CYCLOBENZAPRINE HCL 5 MG
5 TABLET ORAL 3 TIMES DAILY PRN
COMMUNITY

## 2024-10-11 ASSESSMENT — ENCOUNTER SYMPTOMS
TROUBLE SWALLOWING: 0
VOMITING: 0
GASTROINTESTINAL NEGATIVE: 1
FACIAL SWELLING: 0
SHORTNESS OF BREATH: 0
EYE PAIN: 0
EYE DISCHARGE: 0
ABDOMINAL PAIN: 0
COLOR CHANGE: 0
DIARRHEA: 0
EYES NEGATIVE: 1
CHEST TIGHTNESS: 0
APNEA: 0
RESPIRATORY NEGATIVE: 1

## 2024-10-11 NOTE — PROGRESS NOTES
Subjective:     Patient ID:  Prema Barney is a 44 y.o. female.    HPI:    Thyroid  Patient presents for evaluation of hypothyroidism and multiple thyroid nodules. Current symptoms include denies fatigue, weight changes, heat/cold intolerance, bowel/skin changes or CVS symptoms. Patient denies denies fatigue, weight changes, heat/cold intolerance, bowel/skin changes or CVS symptoms.    Thyroid labwork - yes  Findings - no known disease    History of radiation? no  US? yes  Results -nodule left 2.6cm  FNA? yes  Results - negative      Pt ws having some swallowing issues prior to FNA but has been better since then.      Past Medical History:   Diagnosis Date    Anxiety     Depression     Diabetes mellitus (HCC)     pre diabetes    High cholesterol      Past Surgical History:   Procedure Laterality Date    ARM SURGERY Left     CERVIX SURGERY      LITHOTRIPSY      2019    PAIN MANAGEMENT PROCEDURE N/A 2024    CERVICAL 7-THORACIC 1 EPIDURAL STEROID INJECTION UNDER FLUOROSCOPIC GUIDANCE performed by Monika Sheth MD at [facility] OR    TUBAL LIGATION           Family History   Problem Relation Age of Onset    Cancer Mother         breasts cancer, non hodkin    Hypertension Mother     Diabetes Father     Hypertension Father     Depression Father     Lung Disease Father      Social History     Socioeconomic History    Marital status: Single     Spouse name: None    Number of children: None    Years of education: None    Highest education level: None   Tobacco Use    Smoking status: Former     Current packs/day: 0.00     Average packs/day: 1 pack/day for 5.0 years (5.0 ttl pk-yrs)     Types: Cigarettes     Start date:      Quit date: 2000     Years since quittin.7     Passive exposure: Never    Smokeless tobacco: Never   Vaping Use    Vaping status: Never Used   Substance and Sexual Activity    Alcohol use: Yes     Alcohol/week: 1.0 standard drink of alcohol     Types: 1 Shots of liquor per week

## 2024-11-05 ENCOUNTER — OFFICE VISIT (OUTPATIENT)
Dept: PAIN MANAGEMENT | Age: 44
End: 2024-11-05
Payer: COMMERCIAL

## 2024-11-05 VITALS
HEIGHT: 66 IN | SYSTOLIC BLOOD PRESSURE: 132 MMHG | WEIGHT: 270 LBS | HEART RATE: 97 BPM | TEMPERATURE: 97.1 F | OXYGEN SATURATION: 96 % | DIASTOLIC BLOOD PRESSURE: 86 MMHG | BODY MASS INDEX: 43.39 KG/M2 | RESPIRATION RATE: 16 BRPM

## 2024-11-05 DIAGNOSIS — M48.02 CERVICAL STENOSIS OF SPINE: ICD-10-CM

## 2024-11-05 DIAGNOSIS — M54.16 LUMBAR RADICULOPATHY: ICD-10-CM

## 2024-11-05 DIAGNOSIS — G89.29 BILATERAL CHRONIC KNEE PAIN: ICD-10-CM

## 2024-11-05 DIAGNOSIS — M54.12 CERVICAL RADICULOPATHY: ICD-10-CM

## 2024-11-05 DIAGNOSIS — G89.29 CHRONIC NECK PAIN: ICD-10-CM

## 2024-11-05 DIAGNOSIS — M25.562 BILATERAL CHRONIC KNEE PAIN: ICD-10-CM

## 2024-11-05 DIAGNOSIS — M17.0 BILATERAL PRIMARY OSTEOARTHRITIS OF KNEE: Primary | ICD-10-CM

## 2024-11-05 DIAGNOSIS — M47.812 CERVICAL SPONDYLOSIS: ICD-10-CM

## 2024-11-05 DIAGNOSIS — M25.561 BILATERAL CHRONIC KNEE PAIN: ICD-10-CM

## 2024-11-05 DIAGNOSIS — M50.30 DDD (DEGENERATIVE DISC DISEASE), CERVICAL: ICD-10-CM

## 2024-11-05 DIAGNOSIS — M54.2 CHRONIC NECK PAIN: ICD-10-CM

## 2024-11-05 PROCEDURE — 99204 OFFICE O/P NEW MOD 45 MIN: CPT | Performed by: STUDENT IN AN ORGANIZED HEALTH CARE EDUCATION/TRAINING PROGRAM

## 2024-11-05 PROCEDURE — 99214 OFFICE O/P EST MOD 30 MIN: CPT | Performed by: STUDENT IN AN ORGANIZED HEALTH CARE EDUCATION/TRAINING PROGRAM

## 2024-11-05 RX ORDER — CYCLOBENZAPRINE HCL 5 MG
5 TABLET ORAL NIGHTLY PRN
Qty: 30 TABLET | Refills: 0 | Status: SHIPPED | OUTPATIENT
Start: 2024-11-05 | End: 2024-12-05

## 2024-11-05 NOTE — PROGRESS NOTES
[facility] - Pain Medicine  [address] [place] [address]     Pain Medicine Follow Up Note      Prema Barney     Date of Visit:  11/5/2024    CC:  Patient presents for follow up   Chief Complaint   Patient presents with    Follow-up     Follow up to knee injections       HPI:    Pain is better.  Medication side effects: no  Recent interventional procedures: B/L Knee inj - Excellent..  Blood Thinners/Anticoagulation: no  Herbal Supplements: no  Pertinent Allergies: yes - Latex  Diabetic: no  Bowel/Bladder Incontinence: no    Previous Plan:  PT - started  Knee inj - done      Interval Changes:  Significant improvement in knee pain since knee inj  Increased function / ROM      Procedures:    8/16/24 - C7/T1 LICO - 95 % relief for 3+month  10/01/24 - B/L IA Knee inj - 99% relief for 1+month     Previous Treatments:    ibuprofen, lidocaine rub, gabapentin, Percocet, Flexeril, steroids, Wellbutrin, PT, chiropractic therapy, injections.     Potential Aberrant Drug-Related Behavior:  no      Imaging/Studies: New: no     XRAY:  XR Knees 7/24  IMPRESSION:  Moderate tricompartmental DJD with medial joint space narrowing right  slightly greater than left of involvement without acute fracture.      MRI:  MRI CERVICAL SPINE WO CONTRAST 07/19/2024     BONES/ALIGNMENT: There is normal alignment of the spine. The vertebral body  heights are maintained. The bone marrow signal appears unremarkable.    SPINAL CORD: No abnormal cord signal is seen.    SOFT TISSUES: No paraspinal mass identified.    C2-C3: There is no significant disc protrusion, spinal canal stenosis or  neural foraminal narrowing.    C3-C4: No significant disc herniation.  Mild uncovertebral joint hypertrophy.  No significant central canal or neural foraminal stenosis.    C4-C5: Disc desiccation with a small disc osteophyte complex.  Bilateral  uncovertebral joint hypertrophy.  Moderate central canal stenosis.  Mild  right and moderate to severe

## 2024-11-05 NOTE — PROGRESS NOTES
Prema Barney presents to the [facility] on 11/5/2024. Prema is complaining of pain no pain, but stiffness to the outside of her knees. The pain is intermittent. The pain is described as shooting, numb, and pinching. Pain is rated on her best day at a 0, on her worst day at a 4, and on average at a 2 on the VAS scale. She took her last dose of  na  na.     Any procedures since your last visit: Yes, with 99 % relief.    Pacemaker or defibrillator: No managed by na.    She is not on NSAIDS and is not on anticoagulation medications to include none and is managed by na.     Medication Contract and Consent for Opioid Use Documents Filed        No documents found                    There were no vitals taken for this visit.     No LMP recorded.

## 2025-01-08 ENCOUNTER — OFFICE VISIT (OUTPATIENT)
Age: 45
End: 2025-01-08
Payer: COMMERCIAL

## 2025-01-08 VITALS
HEART RATE: 87 BPM | HEIGHT: 67 IN | DIASTOLIC BLOOD PRESSURE: 93 MMHG | SYSTOLIC BLOOD PRESSURE: 136 MMHG | WEIGHT: 270 LBS | OXYGEN SATURATION: 98 % | BODY MASS INDEX: 42.38 KG/M2 | TEMPERATURE: 98.1 F | RESPIRATION RATE: 16 BRPM

## 2025-01-08 DIAGNOSIS — M48.02 CERVICAL STENOSIS OF SPINE: ICD-10-CM

## 2025-01-08 DIAGNOSIS — M50.30 DDD (DEGENERATIVE DISC DISEASE), CERVICAL: ICD-10-CM

## 2025-01-08 DIAGNOSIS — M54.12 CERVICAL RADICULOPATHY: Primary | ICD-10-CM

## 2025-01-08 DIAGNOSIS — M17.0 BILATERAL PRIMARY OSTEOARTHRITIS OF KNEE: ICD-10-CM

## 2025-01-08 DIAGNOSIS — M47.812 CERVICAL SPONDYLOSIS: ICD-10-CM

## 2025-01-08 PROCEDURE — 99214 OFFICE O/P EST MOD 30 MIN: CPT | Performed by: STUDENT IN AN ORGANIZED HEALTH CARE EDUCATION/TRAINING PROGRAM

## 2025-01-08 RX ORDER — SODIUM CHLORIDE 9 MG/ML
INJECTION, SOLUTION INTRAVENOUS PRN
OUTPATIENT
Start: 2025-01-08

## 2025-01-08 RX ORDER — SODIUM CHLORIDE 0.9 % (FLUSH) 0.9 %
5-40 SYRINGE (ML) INJECTION PRN
OUTPATIENT
Start: 2025-01-08

## 2025-01-08 RX ORDER — CYCLOBENZAPRINE HCL 5 MG
5 TABLET ORAL 3 TIMES DAILY PRN
COMMUNITY
End: 2025-01-08 | Stop reason: SDUPTHER

## 2025-01-08 RX ORDER — CYCLOBENZAPRINE HCL 5 MG
5 TABLET ORAL NIGHTLY PRN
Qty: 30 TABLET | Refills: 0 | Status: SHIPPED | OUTPATIENT
Start: 2025-01-08 | End: 2025-02-07

## 2025-01-08 RX ORDER — SODIUM CHLORIDE 0.9 % (FLUSH) 0.9 %
5-40 SYRINGE (ML) INJECTION EVERY 12 HOURS SCHEDULED
OUTPATIENT
Start: 2025-01-08

## 2025-01-08 NOTE — PROGRESS NOTES
Prema Barney presents to the [facility] on 1/8/2025. Prema is complaining of pain neck. The pain is persistent. The pain is described as aching and tingling. Pain is rated on her best day at a 0, on her worst day at a 3, and on average at a 1 on the VAS scale. She took her last dose of Flexeril Monday night.     Any procedures since your last visit: No,     Pacemaker or defibrillator: No\    She is not on NSAIDS and is not on anticoagulation medications     Do you want someone present when the provider examines you? Flaco's blood pressure was elevated today. She was instructed to contact his primary care provider as soon as possible.    Medication Contract and Consent for Opioid Use Documents Filed        No documents found                    BP (!) 136/93   Pulse 87   Temp 98.1 °F (36.7 °C) (Infrared)   Resp 16   Ht 1.702 m (5' 7\")   Wt 122.5 kg (270 lb)   SpO2 98%   BMI 42.29 kg/m²      No LMP recorded.  
  REFLEXES Left Right   Brachioradialis (C5/6)  2+ 2+   Biceps (C5/6)  2+ 2+   Triceps (C7)  2+ 2+   Quadriceps / Patellar (L4)  2+ 2+   Achilles (S1)  2+ 2+      Gait:normal  Assistance Devices: no     Dermatology: Skin:no unusual rashes    Impression:  Assessment/Plan:    ICD-10-CM    1. Cervical radiculopathy  M54.12       2. Bilateral primary osteoarthritis of knee  M17.0       3. Cervical spondylosis  M47.812       4. DDD (degenerative disc disease), cervical  M50.30       5. Cervical stenosis of spine  M48.02               44 y.o. female with h/o DHAVAL, MDD, HLD, OA  who p/w chronic neck pain with radiation of symptoms down both arms and some numbness tingling in the hands, as well as chronic bilateral knee pain.  Patient previously seen pain management in [facility] and did have some injections in her back and knees, unsure of the type of injection..      Previous treatments included ibuprofen, lidocaine rub, gabapentin, Percocet, Flexeril, steroids, Wellbutrin, PT, chiropractic therapy, injections.     Imaging and studies were reviewed with patient including CT soft tissue neck 5/20/2024 showed multilevel degenerative disc disease with some neuroforaminal and central stenosis at C4-5 and C5-6 with spondylosis at multiple levels. MRI C spine showed Moderate central canal stenosis at C4-5. XR Knees showed moderate b/l Knee OA.      Differential diagnosis includes (but is not limited to)  Myofascial Pain Syndrome, Cervical Spondylosis, Cervical Radiculopathy, Cervical Spinal Stenosis, knee O/A .     She is s/p C7/T1 LICO with 95% relief for 3+ months. She is s/p B/L IA knee inj with 99% relief for 3+ month.      Our treatment plan will continue with HEP for the neck and knees.   We will continue Flexeril 5 mg QHS PRN for spasms.  We will schedule her for repeat C7-T1 LICO, right paramedian.  RBA discussed and she is in agreement.  She will need to hold NSAIDs.    Treatment plan discussed with the patient

## 2025-01-21 LAB — NONINV COLON CA DNA+OCC BLD SCRN STL QL: NEGATIVE

## 2025-01-27 ENCOUNTER — OFFICE VISIT (OUTPATIENT)
Dept: FAMILY MEDICINE CLINIC | Age: 45
End: 2025-01-27

## 2025-01-27 VITALS
SYSTOLIC BLOOD PRESSURE: 126 MMHG | OXYGEN SATURATION: 99 % | BODY MASS INDEX: 43.16 KG/M2 | HEART RATE: 100 BPM | RESPIRATION RATE: 20 BRPM | HEIGHT: 67 IN | TEMPERATURE: 98 F | WEIGHT: 275 LBS | DIASTOLIC BLOOD PRESSURE: 86 MMHG

## 2025-01-27 DIAGNOSIS — F32.A DEPRESSION, UNSPECIFIED DEPRESSION TYPE: ICD-10-CM

## 2025-01-27 DIAGNOSIS — E11.9 TYPE 2 DIABETES MELLITUS WITHOUT COMPLICATION, WITHOUT LONG-TERM CURRENT USE OF INSULIN (HCC): Primary | ICD-10-CM

## 2025-01-27 LAB — HBA1C MFR BLD: 6.7 %

## 2025-01-27 SDOH — ECONOMIC STABILITY: FOOD INSECURITY: WITHIN THE PAST 12 MONTHS, THE FOOD YOU BOUGHT JUST DIDN'T LAST AND YOU DIDN'T HAVE MONEY TO GET MORE.: SOMETIMES TRUE

## 2025-01-27 SDOH — ECONOMIC STABILITY: FOOD INSECURITY: WITHIN THE PAST 12 MONTHS, YOU WORRIED THAT YOUR FOOD WOULD RUN OUT BEFORE YOU GOT MONEY TO BUY MORE.: SOMETIMES TRUE

## 2025-01-27 ASSESSMENT — COLUMBIA-SUICIDE SEVERITY RATING SCALE - C-SSRS
6. HAVE YOU EVER DONE ANYTHING, STARTED TO DO ANYTHING, OR PREPARED TO DO ANYTHING TO END YOUR LIFE?: NO
2. HAVE YOU ACTUALLY HAD ANY THOUGHTS OF KILLING YOURSELF?: NO
1. WITHIN THE PAST MONTH, HAVE YOU WISHED YOU WERE DEAD OR WISHED YOU COULD GO TO SLEEP AND NOT WAKE UP?: NO

## 2025-01-27 ASSESSMENT — PATIENT HEALTH QUESTIONNAIRE - PHQ9
6. FEELING BAD ABOUT YOURSELF - OR THAT YOU ARE A FAILURE OR HAVE LET YOURSELF OR YOUR FAMILY DOWN: MORE THAN HALF THE DAYS
SUM OF ALL RESPONSES TO PHQ QUESTIONS 1-9: 12
3. TROUBLE FALLING OR STAYING ASLEEP: SEVERAL DAYS
4. FEELING TIRED OR HAVING LITTLE ENERGY: SEVERAL DAYS
2. FEELING DOWN, DEPRESSED OR HOPELESS: MORE THAN HALF THE DAYS
8. MOVING OR SPEAKING SO SLOWLY THAT OTHER PEOPLE COULD HAVE NOTICED. OR THE OPPOSITE, BEING SO FIGETY OR RESTLESS THAT YOU HAVE BEEN MOVING AROUND A LOT MORE THAN USUAL: SEVERAL DAYS
SUM OF ALL RESPONSES TO PHQ QUESTIONS 1-9: 11
9. THOUGHTS THAT YOU WOULD BE BETTER OFF DEAD, OR OF HURTING YOURSELF: SEVERAL DAYS
SUM OF ALL RESPONSES TO PHQ QUESTIONS 1-9: 12
1. LITTLE INTEREST OR PLEASURE IN DOING THINGS: MORE THAN HALF THE DAYS
7. TROUBLE CONCENTRATING ON THINGS, SUCH AS READING THE NEWSPAPER OR WATCHING TELEVISION: SEVERAL DAYS
SUM OF ALL RESPONSES TO PHQ QUESTIONS 1-9: 12
5. POOR APPETITE OR OVEREATING: SEVERAL DAYS
SUM OF ALL RESPONSES TO PHQ9 QUESTIONS 1 & 2: 4

## 2025-01-27 NOTE — PROGRESS NOTES
[facility]  [facility]    Subjective:  Dm2: not taking Metformin regularly  Mood issues: worse in winter months      ROS otherwise negative     Past medical, surgical, family and social history were reviewed, non-contributory, and unchanged unless otherwise stated.    Objective:    /86   Pulse 100   Temp 98 °F (36.7 °C) (Temporal)   Resp 20   Ht 1.702 m (5' 7\")   Wt 124.7 kg (275 lb)   SpO2 99%   BMI 43.07 kg/m²     Exam is as noted by resident with the following changes, additions or corrections:    General:  NAD; alert & oriented x 3   Heart:  RRR, no murmurs, gallops, or rubs.  Lungs:  CTA bilaterally, no wheeze, rales or rhonchi  Abd: bowel sounds present, nontender, nondistended, no masses  Extrem:  No clubbing, cyanosis, or edema    Assessment/Plan:  Dm2: continue metformin. Monitor sugars, refer to diabetic education  Mood disorder: monitor, offered counseling      Attending Physician Statement  I have reviewed the chart, including any radiology or labs. I have discussed the case, including pertinent history and exam findings with the resident.  I agree with the assessment, plan and orders as documented by the resident.  Please refer to the resident note for additional information.      Electronically signed by GI LEAL MD on 1/27/2025 at 3:02 PM

## 2025-01-27 NOTE — PATIENT INSTRUCTIONS
[place] [employer] - Financial Resources*  (Call [employer]/211 if need more resources.)       Utility:  [employer]  What they offer: Limited assistance to restore/ prevent utility disconnection.  Phone Number: [phone]  Address: [facility] [place] [address]  Website: [employer]  [facility] Action Program  Utility assistance  [phone]  [facility] Community Action Partnership  Utility assistance   [phone]  Community Action Agency of [facility]  Utility assistance  [phone]  Financial or Medical  HELP NETWORK OF [facility]:  What they do: Provides 24-hr, 7 days a week access to information on community resources for financial help. [facility] AND [facility]  Phone: 211 or [phone]            [facility] JOB AND FAMILY SERVICES:  MAIN [facility] LINE FOR ALL [place]: [phone]  What they do: [place] works first with temporary cash assistance if there are children in household.   [facility] DJFS: [address] [place] [address]  Phone: [phone], [phone]  [facility] DJFS: [address] [place] [address]  Phone: [phone]  [facility] DJFS: [address] [place] [address]  Phone: [phone]  Website: s.[place].[facility]    [employer] Financial Assistance  What they offer: Assistance with [employer] bills  Phone: [phone], option #5   Medications:  [employer]  What they offer: [employer] tracks prescription drug prices and provides free drug coupons for discounts on medications.  Website: https://www.[employer]  [employer]  What they offer: [employer] offers free information on medications and healthcare cost savings programs including prescription assistance programs, coupons, and discount programs.  Helpline: 695.152.1965  Website: https://www.needRxRevueds.org    [employer] Assist  What they offer: Information about free and low-cost medicine programs.  Website:

## 2025-01-27 NOTE — PROGRESS NOTES
[facility]  Department of Family Medicine  Family Medicine Residency Program      Patient: Prema Barney 44 y.o. female     Date of Service: 25      Chief complaint:   Chief Complaint   Patient presents with    Diabetes       HISTORY OF PRESENTING ILLNESS     44 y.o. female presented to the clinic      DM:  Is on metformin 500 mg BID.   - taking regularly.   - did not take it for last few days as she was sick and was nausea.- almost 4 times. - took it today.   - not watching diet.   - wants to talk to educator.   - eye exam is due, will make appointment.   Lab Results   Component Value Date/Time    LABA1C 6.7 2025 02:19 PM    LABA1C 6.7 10/04/2024 09:10 AM    LABA1C 7.0 2024 10:38 AM         Having lump/ bloated abdomen but she stated that she is having periods and does not wants to be examined and checked.       depression:  - every January to February.   - in this month her grandfather and grand mothers .   - refuses meds.   - have friends who she can call support group.   - hx of hospitalization 2/2 suicidal ideation 2018  - denies SI., HI now.         Health Maintenance:  Health Maintenance Due   Topic Date Due    Pneumococcal 0-64 years Vaccine (1 of 2 - PCV) Never done    Diabetic foot exam  Never done    Diabetic retinal exam  Never done    Hepatitis B vaccine (1 of 3 - 19+ 3-dose series) Never done    DTaP/Tdap/Td vaccine (1 - Tdap) Never done    Flu vaccine (1) Never done    COVID-19 Vaccine (1 - -24 season) Never done     Past Medical History:      Diagnosis Date    Anxiety     Depression     Diabetes mellitus (HCC)     pre diabetes    High cholesterol      Past Surgical History:        Procedure Laterality Date    ARM SURGERY Left     CERVIX SURGERY      LITHOTRIPSY      2019    PAIN MANAGEMENT PROCEDURE N/A 2024    CERVICAL 7-THORACIC 1 EPIDURAL STEROID INJECTION UNDER FLUOROSCOPIC GUIDANCE performed by Monika Sheth MD at [facility] OR    TUBAL

## 2025-01-28 ASSESSMENT — ENCOUNTER SYMPTOMS
SHORTNESS OF BREATH: 0
VOMITING: 0
DIARRHEA: 0
CONSTIPATION: 0
COUGH: 0
ABDOMINAL PAIN: 0
NAUSEA: 0
CHEST TIGHTNESS: 0

## 2025-02-04 ENCOUNTER — TELEPHONE (OUTPATIENT)
Dept: PAIN MANAGEMENT | Age: 45
End: 2025-02-04

## 2025-02-04 NOTE — TELEPHONE ENCOUNTER
Call to Prema Barney & left a message that procedure was scheduled for 02/11/2025 and that [facility] should call her a few days before for the pre op call and between 2:00 PM and 4:00 PM  the business day before with the arrival time. Instructed Prema to hold ibuprofen for 24 hours, Celebrex, Mobic, and naprosyn for 4 days and any aspirin containing products, CoQ 10, or fish oil for 7 days. Instructed to call office back if any questions.     Electronically signed by Cheryl Canales RN on 2/4/2025 at 2:38 PM

## 2025-02-05 NOTE — PROGRESS NOTES
[facility] PAIN MANAGEMENT  INSTRUCTIONS  ...........................................................................................................................................     [x] Parking the day of Surgery is located in the Main Entrance lot.  Upon entering the door, make immediate right into the surgery reception room    [x]  Bring photo ID and insurance card     [x] You may have a light breakfast day of procedure    [x]  Wear loose comfortable clothing    [x]  Please follow instructions for medications as given per Dr's office    [x] You can expect a call the business day prior to procedure to notify you of your arrival time     [x] Please arrange for     []  Other instructions

## 2025-02-06 ENCOUNTER — TELEPHONE (OUTPATIENT)
Dept: ENT CLINIC | Age: 45
End: 2025-02-06

## 2025-02-06 NOTE — TELEPHONE ENCOUNTER
[facility] to authorize order with patient insurance. Patient is scheduled for 6 month thyroid ultrasound with radiology on 4/4/25 @ 10:00am. Patient has been notified of date and time and that they need to arrive at 9:30am. Patient was informed she has no prep prior to procedure. Patient instructed to park in [facility] parking lot and report to registration. Patient verbalized understanding.    Electronically signed by Bethanie Ivory MA on 2/6/25 at 8:51 AM EST

## 2025-02-11 ENCOUNTER — HOSPITAL ENCOUNTER (OUTPATIENT)
Age: 45
Setting detail: OUTPATIENT SURGERY
Discharge: HOME | End: 2025-02-11
Attending: STUDENT IN AN ORGANIZED HEALTH CARE EDUCATION/TRAINING PROGRAM | Admitting: STUDENT IN AN ORGANIZED HEALTH CARE EDUCATION/TRAINING PROGRAM
Payer: COMMERCIAL

## 2025-02-11 ENCOUNTER — HOSPITAL ENCOUNTER (OUTPATIENT)
Dept: GENERAL RADIOLOGY | Age: 45
Discharge: HOME | End: 2025-02-13
Attending: STUDENT IN AN ORGANIZED HEALTH CARE EDUCATION/TRAINING PROGRAM
Payer: COMMERCIAL

## 2025-02-11 VITALS
HEIGHT: 66 IN | HEART RATE: 90 BPM | WEIGHT: 270 LBS | SYSTOLIC BLOOD PRESSURE: 127 MMHG | RESPIRATION RATE: 18 BRPM | DIASTOLIC BLOOD PRESSURE: 87 MMHG | BODY MASS INDEX: 43.39 KG/M2 | OXYGEN SATURATION: 98 % | TEMPERATURE: 97.3 F

## 2025-02-11 DIAGNOSIS — R52 PAIN MANAGEMENT: ICD-10-CM

## 2025-02-11 LAB
GLUCOSE BLD-MCNC: 135 MG/DL (ref 74–99)
HCG, URINE, POC: NEGATIVE
Lab: NORMAL
NEGATIVE QC PASS/FAIL: NORMAL
POSITIVE QC PASS/FAIL: NORMAL

## 2025-02-11 PROCEDURE — 62321 NJX INTERLAMINAR CRV/THRC: CPT | Performed by: STUDENT IN AN ORGANIZED HEALTH CARE EDUCATION/TRAINING PROGRAM

## 2025-02-11 PROCEDURE — 6360000002 HC RX W HCPCS: Performed by: STUDENT IN AN ORGANIZED HEALTH CARE EDUCATION/TRAINING PROGRAM

## 2025-02-11 PROCEDURE — 3600000012 HC SURGERY LEVEL 2 ADDTL 15MIN: Performed by: STUDENT IN AN ORGANIZED HEALTH CARE EDUCATION/TRAINING PROGRAM

## 2025-02-11 PROCEDURE — 7100000011 HC PHASE II RECOVERY - ADDTL 15 MIN: Performed by: STUDENT IN AN ORGANIZED HEALTH CARE EDUCATION/TRAINING PROGRAM

## 2025-02-11 PROCEDURE — 6360000004 HC RX CONTRAST MEDICATION: Performed by: STUDENT IN AN ORGANIZED HEALTH CARE EDUCATION/TRAINING PROGRAM

## 2025-02-11 PROCEDURE — 3600000002 HC SURGERY LEVEL 2 BASE: Performed by: STUDENT IN AN ORGANIZED HEALTH CARE EDUCATION/TRAINING PROGRAM

## 2025-02-11 PROCEDURE — 2709999900 HC NON-CHARGEABLE SUPPLY: Performed by: STUDENT IN AN ORGANIZED HEALTH CARE EDUCATION/TRAINING PROGRAM

## 2025-02-11 PROCEDURE — 82962 GLUCOSE BLOOD TEST: CPT

## 2025-02-11 PROCEDURE — 7100000010 HC PHASE II RECOVERY - FIRST 15 MIN: Performed by: STUDENT IN AN ORGANIZED HEALTH CARE EDUCATION/TRAINING PROGRAM

## 2025-02-11 RX ORDER — LIDOCAINE HYDROCHLORIDE 5 MG/ML
INJECTION, SOLUTION INFILTRATION; INTRAVENOUS PRN
Status: DISCONTINUED | OUTPATIENT
Start: 2025-02-11 | End: 2025-02-11 | Stop reason: ALTCHOICE

## 2025-02-11 RX ORDER — SODIUM CHLORIDE 9 MG/ML
INJECTION, SOLUTION INTRAVENOUS PRN
Status: DISCONTINUED | OUTPATIENT
Start: 2025-02-11 | End: 2025-02-11 | Stop reason: HOSPADM

## 2025-02-11 RX ORDER — SODIUM CHLORIDE 0.9 % (FLUSH) 0.9 %
5-40 SYRINGE (ML) INJECTION PRN
Status: DISCONTINUED | OUTPATIENT
Start: 2025-02-11 | End: 2025-02-11 | Stop reason: HOSPADM

## 2025-02-11 RX ORDER — SODIUM CHLORIDE 0.9 % (FLUSH) 0.9 %
5-40 SYRINGE (ML) INJECTION EVERY 12 HOURS SCHEDULED
Status: DISCONTINUED | OUTPATIENT
Start: 2025-02-11 | End: 2025-02-11 | Stop reason: HOSPADM

## 2025-02-11 RX ORDER — DEXAMETHASONE SODIUM PHOSPHATE 10 MG/ML
INJECTION, SOLUTION INTRAMUSCULAR; INTRAVENOUS PRN
Status: DISCONTINUED | OUTPATIENT
Start: 2025-02-11 | End: 2025-02-11 | Stop reason: ALTCHOICE

## 2025-02-11 RX ORDER — IOPAMIDOL 612 MG/ML
INJECTION, SOLUTION INTRATHECAL PRN
Status: DISCONTINUED | OUTPATIENT
Start: 2025-02-11 | End: 2025-02-11 | Stop reason: ALTCHOICE

## 2025-02-11 ASSESSMENT — PAIN - FUNCTIONAL ASSESSMENT
PAIN_FUNCTIONAL_ASSESSMENT: 0-10
PAIN_FUNCTIONAL_ASSESSMENT: 0-10

## 2025-02-11 NOTE — DISCHARGE INSTRUCTIONS
[facility] Pain Management Department  [place]-[phone]  Dr. Ras Madden   Post-Pain Block/Radiofrequency  Home Going Instructions    1-Go home, rest for the remainder of the day  2-Please do not lift over 20 pounds the day of the injection  3-If you received sedation No: alcohol, driving, operating lawn mowers, plows, tractors or other dangerous equipment until next morning. Do not make important decisions or sign legal documents for 24 hours. You may experience light headedness, dizziness, nausea or sleepiness after sedation. Do not stay alone. A responsible adult must be with you for 24 hours. You could be nauseated from the medications you have received. Your IV site may be sore and bruised.    4-No dietary restrictions     5-Resume all medications the same day, blood thinners to be resumed 24 hours after injection if you were instructed to stop any.    6-Keep the surgical site clean and dry, you may shower the next morning and remove the      dressing.     7- No sitz baths, tub baths or hot tubs/swimming for 24 hours.       8- If you have any pain at the injection site(s), application of an ice pack to the area should be       helpful, 20 minutes on/20 minutes off for next 48 hours.  9- Call [facility] immediately at if you develop.  Fever greater than 100.4 F  Have bleeding or drainage from the puncture site  Have progressive Leg/arm numbness and or weakness  Loss of control of bowel and or bladder (wet/soil yourself)  Severe headache with inability to lift head  10-You may return to work the next day

## 2025-02-11 NOTE — OP NOTE
2025    Patient: Prema Barney  :  1980  Age:  44 y.o.  Sex:  female     PRE-PROCEDURE DIAGNOSIS: Cervical degenerative disc disease, cervical radiculopathy.    POST-PROCEDURE DIAGNOSIS: Same.    PROCEDURE: Cervical epidural steroid injection at  C7/T1 level under fluroscopic guidance,  #1     SURGEON:  Monika Sheth MD    ANESTHESIA: Local    ESTIMATED BLOOD LOSS: None.  ______________________________________________________________________  BRIEF HISTORY:  Prema Barney comes in today for the first  therapeutic cervical epidural injection under fluoroscopic guidance. The potential complications of this procedure were discussed with the her again today.  She has elected to undergo the aforementioned procedure.    Prema’s complete History & Physical examination were reviewed in depth, a copy of which is in the chart.      DESCRIPTION OF PROCEDURE:    After confirming written and informed consent, a time-out was performed and Prema’s name and date of birth, the procedure to be performed as well as the plan for the location of the needle insertion were confirmed.    The patient was brought into the procedure room and placed in the prone position with the head flexed midline on the fluoroscopy table. A pillow was placed under the patient's head to increase cervical interlaminar space. Standard monitors were placed, and vital signs were observed throughout the procedure. The area was prepped with chloraprep and the C7/T1 interspace was marked under fluoroscopy. The skin and subcutaneous tissues at the above level were anesthestized with 0.5% lidocaine. An # 18 gauge 3 1/2 tuohy epidural needle was inserted and advanced toward the inferior lamina until bony contact was made. The needle was then advanced superiorly toward epidural space .   From this point on hanging drop/loss of resistance technique with 5 cc glass syringe was used to confirm entrance of the needle into the epidural space under

## 2025-02-11 NOTE — H&P
[facility]  Pain Medicine  [address] [place] [address]    Procedure History & Physical      Prema Barney     HPI:    Patient  is here with  chronic neck pain, RUE  pain for C7/T1 LICO   Labs/imaging studies reviewed   All question and concerns addressed including R/B/A associated with the procedure    Past Medical History:   Diagnosis Date    Anxiety     Arthritis     Cervical pain     Depression     Diabetes mellitus (HCC)     High cholesterol        Past Surgical History:   Procedure Laterality Date    ARM SURGERY Left     CERVIX SURGERY      LITHOTRIPSY          PAIN MANAGEMENT PROCEDURE N/A 2024    CERVICAL 7-THORACIC 1 EPIDURAL STEROID INJECTION UNDER FLUOROSCOPIC GUIDANCE performed by Monika Sheth MD at [facility] OR    TUBAL LIGATION             Prior to Admission medications    Medication Sig Start Date End Date Taking? Authorizing Provider   Turmeric-Senia-Black Pepper 125-6-50 MG-MG-MCG CHEW Take by mouth   Yes Provider, MD Harmony   metFORMIN (GLUCOPHAGE) 500 MG tablet Take 1 tablet by mouth 2 times daily (with meals) 25   Natty Pierce MD   atorvastatin (LIPITOR) 20 MG tablet Take 1 tablet by mouth daily 10/4/24   Natty Pierce MD       Allergies   Allergen Reactions    Latex Hives and Rash    Mushroom Extract Complex (Do Not Select) Anaphylaxis and Nausea And Vomiting       Social History     Socioeconomic History    Marital status: Single     Spouse name: Not on file    Number of children: Not on file    Years of education: Not on file    Highest education level: Not on file   Occupational History    Not on file   Tobacco Use    Smoking status: Former     Current packs/day: 0.00     Average packs/day: 1 pack/day for 5.0 years (5.0 ttl pk-yrs)     Types: Cigarettes     Start date:      Quit date:      Years since quittin.1     Passive exposure: Never    Smokeless tobacco: Never   Vaping Use    Vaping status: Never

## 2025-02-19 ENCOUNTER — HOSPITAL ENCOUNTER (OUTPATIENT)
Dept: DIABETES SERVICES | Age: 45
Discharge: HOME | End: 2025-02-19

## 2025-03-12 ENCOUNTER — OFFICE VISIT (OUTPATIENT)
Age: 45
End: 2025-03-12
Payer: COMMERCIAL

## 2025-03-12 VITALS
DIASTOLIC BLOOD PRESSURE: 82 MMHG | TEMPERATURE: 98.2 F | WEIGHT: 270 LBS | HEART RATE: 99 BPM | OXYGEN SATURATION: 97 % | HEIGHT: 66 IN | RESPIRATION RATE: 16 BRPM | SYSTOLIC BLOOD PRESSURE: 130 MMHG | BODY MASS INDEX: 43.39 KG/M2

## 2025-03-12 DIAGNOSIS — M50.30 DDD (DEGENERATIVE DISC DISEASE), CERVICAL: ICD-10-CM

## 2025-03-12 DIAGNOSIS — M17.0 BILATERAL PRIMARY OSTEOARTHRITIS OF KNEE: ICD-10-CM

## 2025-03-12 DIAGNOSIS — M48.02 CERVICAL STENOSIS OF SPINE: ICD-10-CM

## 2025-03-12 DIAGNOSIS — M54.12 CERVICAL RADICULOPATHY: Primary | ICD-10-CM

## 2025-03-12 DIAGNOSIS — M54.16 LUMBAR RADICULOPATHY: ICD-10-CM

## 2025-03-12 DIAGNOSIS — M47.812 CERVICAL SPONDYLOSIS: ICD-10-CM

## 2025-03-12 PROCEDURE — 99214 OFFICE O/P EST MOD 30 MIN: CPT | Performed by: STUDENT IN AN ORGANIZED HEALTH CARE EDUCATION/TRAINING PROGRAM

## 2025-03-12 RX ORDER — CYCLOBENZAPRINE HCL 5 MG
5 TABLET ORAL NIGHTLY PRN
Qty: 30 TABLET | Refills: 0 | Status: SHIPPED | OUTPATIENT
Start: 2025-03-12 | End: 2025-04-11

## 2025-03-12 NOTE — PROGRESS NOTES
[facility] - Pain Medicine  [address] [place] [address]     Pain Medicine Follow Up Note      Prema Barney     Date of Visit:  3/12/2025    CC:  Patient presents for follow up   Chief Complaint   Patient presents with    Follow-up     CERVICAL 7-THORACIC 1 EPIDURAL STEROID INJECTION RIGHT PARAMEDIAN UNDER FLUOROSCOPIC GUIDANCE        HPI:    Pain is better.  Medication side effects: no  Recent interventional procedures: AVILA - excellent  Blood Thinners/Anticoagulation: no  Herbal Supplements: no  Pertinent Allergies: yes - Latex  Diabetic: no  Bowel/Bladder Incontinence: no    Previous Plan:  PT   Flexeril 5mg - taking sparingly with good relief without side effects  LICO - done       Interval Changes:  Recently back to work  Significant improvement in her neck and upper extremity pain since LICO   Recent URI  Still recovering    Procedures:    8/16/24 - C7/T1 LICO - 95 % relief for 3+month  10/01/24 - B/L IA Knee inj - 99% relief for 3+ month  2/11/2025-C7-T1 LICO-95% relief for 1 month    Previous Treatments:    ibuprofen, lidocaine rub, gabapentin, Percocet, Flexeril, steroids, Wellbutrin, PT, chiropractic therapy, injections.     Potential Aberrant Drug-Related Behavior:  no      Imaging/Studies: New: no     XRAY:  XR Knees 7/24  IMPRESSION:  Moderate tricompartmental DJD with medial joint space narrowing right  slightly greater than left of involvement without acute fracture.      MRI:  MRI CERVICAL SPINE WO CONTRAST 07/19/2024     BONES/ALIGNMENT: There is normal alignment of the spine. The vertebral body  heights are maintained. The bone marrow signal appears unremarkable.    SPINAL CORD: No abnormal cord signal is seen.    SOFT TISSUES: No paraspinal mass identified.    C2-C3: There is no significant disc protrusion, spinal canal stenosis or  neural foraminal narrowing.    C3-C4: No significant disc herniation.  Mild uncovertebral joint hypertrophy.  No significant central canal or

## 2025-03-12 NOTE — PROGRESS NOTES
Prema Barney presents to the [facility] on 3/12/2025. Prema is complaining of pain neck. The pain is none Pain is rated on her best day at a 0, on her worst day at a 1, and on average at a 1 on the VAS scale.     Any procedures since your last visit: Yes,     Pacemaker or defibrillator: No     She is not on NSAIDS and is not on anticoagulation medications     Do you want someone present when the provider examines you? No    Medication Contract and Consent for Opioid Use Documents Filed        No documents found                    /82   Pulse 99   Temp 98.2 °F (36.8 °C) (Infrared)   Resp 16   Ht 1.676 m (5' 6\")   Wt 122.5 kg (270 lb)   LMP 01/29/2025   SpO2 97%   BMI 43.58 kg/m²      Patient's last menstrual period was 01/29/2025.

## 2025-04-04 ENCOUNTER — HOSPITAL ENCOUNTER (OUTPATIENT)
Dept: ULTRASOUND IMAGING | Age: 45
Discharge: HOME | End: 2025-04-04
Attending: OTOLARYNGOLOGY
Payer: COMMERCIAL

## 2025-04-04 DIAGNOSIS — E04.1 THYROID NODULE: ICD-10-CM

## 2025-04-04 PROCEDURE — 76536 US EXAM OF HEAD AND NECK: CPT

## 2025-04-15 ENCOUNTER — OFFICE VISIT (OUTPATIENT)
Dept: ENT CLINIC | Age: 45
End: 2025-04-15
Payer: COMMERCIAL

## 2025-04-15 VITALS
HEART RATE: 100 BPM | DIASTOLIC BLOOD PRESSURE: 86 MMHG | HEIGHT: 66 IN | RESPIRATION RATE: 12 BRPM | WEIGHT: 273 LBS | BODY MASS INDEX: 43.87 KG/M2 | SYSTOLIC BLOOD PRESSURE: 130 MMHG | TEMPERATURE: 97.7 F | OXYGEN SATURATION: 95 %

## 2025-04-15 DIAGNOSIS — E04.1 THYROID NODULE: Primary | ICD-10-CM

## 2025-04-15 PROCEDURE — 99213 OFFICE O/P EST LOW 20 MIN: CPT | Performed by: OTOLARYNGOLOGY

## 2025-04-15 RX ORDER — CYCLOBENZAPRINE HCL 10 MG
5 TABLET ORAL 3 TIMES DAILY PRN
COMMUNITY

## 2025-04-15 RX ORDER — MULTIVIT-MIN/IRON/FOLIC ACID/K 18-600-40
2000 CAPSULE ORAL DAILY
COMMUNITY
End: 2025-04-15 | Stop reason: ALTCHOICE

## 2025-04-15 NOTE — PROGRESS NOTES
Subjective:     Patient ID:  Prema Barney is a 44 y.o. female.    HPI:    Thyroid  Patient presents for evaluation of hypothyroidism and multiple thyroid nodules. Current symptoms include denies fatigue, weight changes, heat/cold intolerance, bowel/skin changes or CVS symptoms. Patient denies denies fatigue, weight changes, heat/cold intolerance, bowel/skin changes or CVS symptoms.    Thyroid labwork - yes  Findings - no known disease    History of radiation? no  US? yes  Results -nodule left 2.6cm  FNA? yes  Results - negative      Pt ws having some swallowing issues prior to FNA but has been better since then.        4/15/25: Pt returns for review of US thyroid results. No new complaints.     Past Medical History:   Diagnosis Date    Anxiety     Arthritis     Cervical pain     Depression     Diabetes mellitus (HCC)     High cholesterol      Past Surgical History:   Procedure Laterality Date    ARM SURGERY Left     CERVIX SURGERY      LITHOTRIPSY      2019    PAIN MANAGEMENT PROCEDURE N/A 2024    CERVICAL 7-THORACIC 1 EPIDURAL STEROID INJECTION UNDER FLUOROSCOPIC GUIDANCE performed by Monika Sheth MD at [facility] OR    PAIN MANAGEMENT PROCEDURE Right 2025    CERVICAL 7-THORACIC 1 EPIDURAL STEROID INJECTION RIGHT PARAMEDIAN UNDER FLUOROSCOPIC GUIDANCE   +++LATEX ALLERGY+++ performed by Monika Sheth MD at [facility] OR    TUBAL LIGATION           Family History   Problem Relation Age of Onset    Cancer Mother         breasts cancer, non hodkin    Hypertension Mother     Diabetes Father     Hypertension Father     Depression Father     Lung Disease Father      Social History     Socioeconomic History    Marital status: Single   Tobacco Use    Smoking status: Former     Current packs/day: 0.00     Average packs/day: 1 pack/day for 5.0 years (5.0 ttl pk-yrs)     Types: Cigarettes     Start date:      Quit date:      Years since quittin.3     Passive exposure: Never    Smokeless tobacco:

## 2025-04-28 ENCOUNTER — OFFICE VISIT (OUTPATIENT)
Dept: FAMILY MEDICINE CLINIC | Age: 45
End: 2025-04-28
Payer: COMMERCIAL

## 2025-04-28 VITALS
TEMPERATURE: 97.4 F | HEIGHT: 66 IN | HEART RATE: 96 BPM | RESPIRATION RATE: 18 BRPM | BODY MASS INDEX: 45.16 KG/M2 | OXYGEN SATURATION: 98 % | SYSTOLIC BLOOD PRESSURE: 137 MMHG | WEIGHT: 281 LBS | DIASTOLIC BLOOD PRESSURE: 86 MMHG

## 2025-04-28 DIAGNOSIS — E78.5 HYPERLIPIDEMIA, UNSPECIFIED HYPERLIPIDEMIA TYPE: ICD-10-CM

## 2025-04-28 DIAGNOSIS — G43.811 OTHER MIGRAINE WITH STATUS MIGRAINOSUS, INTRACTABLE: ICD-10-CM

## 2025-04-28 DIAGNOSIS — K21.9 GASTROESOPHAGEAL REFLUX DISEASE WITHOUT ESOPHAGITIS: ICD-10-CM

## 2025-04-28 DIAGNOSIS — E11.9 TYPE 2 DIABETES MELLITUS WITHOUT COMPLICATION, WITHOUT LONG-TERM CURRENT USE OF INSULIN (HCC): Primary | ICD-10-CM

## 2025-04-28 DIAGNOSIS — E66.813 CLASS 3 SEVERE OBESITY WITHOUT SERIOUS COMORBIDITY WITH BODY MASS INDEX (BMI) OF 45.0 TO 49.9 IN ADULT, UNSPECIFIED OBESITY TYPE (HCC): ICD-10-CM

## 2025-04-28 LAB
ALBUMIN: 4.1 G/DL (ref 3.5–5.2)
ALP BLD-CCNC: 123 U/L (ref 35–104)
ALT SERPL-CCNC: 36 U/L (ref 0–35)
ANION GAP SERPL CALCULATED.3IONS-SCNC: 13 MMOL/L (ref 7–16)
AST SERPL-CCNC: 39 U/L (ref 0–35)
BILIRUB SERPL-MCNC: 0.3 MG/DL (ref 0–1.2)
BUN BLDV-MCNC: 10 MG/DL (ref 6–20)
CALCIUM SERPL-MCNC: 9.4 MG/DL (ref 8.6–10)
CHLORIDE BLD-SCNC: 102 MMOL/L (ref 98–107)
CHOLESTEROL, TOTAL: 182 MG/DL
CO2: 23 MMOL/L (ref 22–29)
CREAT SERPL-MCNC: 0.7 MG/DL (ref 0.5–1)
GFR, ESTIMATED: >90 ML/MIN/1.73M2
GLUCOSE BLD-MCNC: 132 MG/DL (ref 74–99)
HBA1C MFR BLD: 6.5 %
HCT VFR BLD CALC: 37.7 % (ref 34–48)
HDLC SERPL-MCNC: 48 MG/DL
HEMOGLOBIN: 11.9 G/DL (ref 11.5–15.5)
LDL CHOLESTEROL: 104 MG/DL
MCH RBC QN AUTO: 26 PG (ref 26–35)
MCHC RBC AUTO-ENTMCNC: 31.6 G/DL (ref 32–34.5)
MCV RBC AUTO: 82.3 FL (ref 80–99.9)
PDW BLD-RTO: 13.6 % (ref 11.5–15)
PLATELET # BLD: 352 K/UL (ref 130–450)
PMV BLD AUTO: 11.5 FL (ref 7–12)
POTASSIUM SERPL-SCNC: 4.6 MMOL/L (ref 3.5–5.1)
RBC # BLD: 4.58 M/UL (ref 3.5–5.5)
SODIUM BLD-SCNC: 138 MMOL/L (ref 136–145)
TOTAL PROTEIN: 7.4 G/DL (ref 6.4–8.3)
TRIGL SERPL-MCNC: 149 MG/DL
VLDLC SERPL CALC-MCNC: 30 MG/DL
WBC # BLD: 10.2 K/UL (ref 4.5–11.5)

## 2025-04-28 PROCEDURE — 83036 HEMOGLOBIN GLYCOSYLATED A1C: CPT

## 2025-04-28 PROCEDURE — 3044F HG A1C LEVEL LT 7.0%: CPT

## 2025-04-28 PROCEDURE — 99214 OFFICE O/P EST MOD 30 MIN: CPT

## 2025-04-28 RX ORDER — ATORVASTATIN CALCIUM 20 MG/1
20 TABLET, FILM COATED ORAL DAILY
Qty: 30 TABLET | Refills: 5 | Status: SHIPPED | OUTPATIENT
Start: 2025-04-28

## 2025-04-28 RX ORDER — PANTOPRAZOLE SODIUM 40 MG/1
40 TABLET, DELAYED RELEASE ORAL
Qty: 90 TABLET | Refills: 0 | Status: SHIPPED | OUTPATIENT
Start: 2025-04-28

## 2025-04-28 RX ORDER — ONDANSETRON 4 MG/1
4 TABLET, ORALLY DISINTEGRATING ORAL 3 TIMES DAILY PRN
Qty: 21 TABLET | Refills: 0 | Status: SHIPPED | OUTPATIENT
Start: 2025-04-28

## 2025-04-28 ASSESSMENT — ENCOUNTER SYMPTOMS
ABDOMINAL PAIN: 0
CONSTIPATION: 0
NAUSEA: 0
CHEST TIGHTNESS: 0
SHORTNESS OF BREATH: 0
COUGH: 0
VOMITING: 0
DIARRHEA: 0

## 2025-04-28 NOTE — PROGRESS NOTES
S: 44 y.o. female with   Chief Complaint   Patient presents with    Diabetes    Headache     Some photosensitivity         Diabetes/obesity   -f/u  -well controlled on metformin    Migraines  -chronic issue  -flare up today     O: VS:  height is 1.676 m (5' 6\") and weight is 127.5 kg (281 lb). Her temporal temperature is 97.4 °F (36.3 °C). Her blood pressure is 137/86 and her pulse is 96. Her respiration is 18 and oxygen saturation is 98%.   BP Readings from Last 3 Encounters:   04/28/25 137/86   04/15/25 130/86   03/12/25 130/82     See resident note    Impression/Plan:   1) Diabetes - consider glp 1   2) Migraines - nsaids      Health Maintenance Due   Topic Date Due    Diabetic foot exam  Never done    Diabetic retinal exam  Never done    Hepatitis B vaccine (1 of 3 - 19+ 3-dose series) Never done    DTaP/Tdap/Td vaccine (1 - Tdap) Never done    Pneumococcal 0-49 years Vaccine (1 of 2 - PCV) Never done    COVID-19 Vaccine (1 - 2024-25 season) Never done         Attending Physician Statement  I have discussed the case, including pertinent history and exam findings with the resident. I also have seen the patient and performed key portions of the examination.  I agree with the documented assessment and plan.      Jesús Cabezas, DO

## 2025-04-28 NOTE — PROGRESS NOTES
[facility]  Department of Family Medicine  Family Medicine Residency Program      Patient: Prema Barney 44 y.o. female     Date of Service: 4/28/25      Chief complaint:   Chief Complaint   Patient presents with    Diabetes    Headache     Some photosensitivity         HISTORY OF PRESENTING ILLNESS     44 y.o. female presented to the clinic     DM:-  - is on metformin 500 BID  - complaint.   Lab Results   Component Value Date/Time    LABA1C 6.5 04/28/2025 08:32 AM    LABA1C 6.7 01/27/2025 02:19 PM    LABA1C 6.7 10/04/2024 09:10 AM     Migraine:  - started this morning.   - last migraine was month ago, took Midol and advil 200 mg on that day.   - feeling Nausea, denies emesis,.    -denies vision changes.   - did not take any med today.   - she woke up with migraine.   - rate 7/10.             Health Maintenance:  Health Maintenance Due   Topic Date Due    Diabetic foot exam  Never done    Diabetic retinal exam  Never done    Hepatitis B vaccine (1 of 3 - 19+ 3-dose series) Never done    DTaP/Tdap/Td vaccine (1 - Tdap) Never done    Pneumococcal 0-49 years Vaccine (1 of 2 - PCV) Never done    COVID-19 Vaccine (1 - 2024-25 season) Never done     Past Medical History:      Diagnosis Date    Anxiety     Arthritis     Cervical pain     Depression     Diabetes mellitus (HCC)     High cholesterol      Past Surgical History:        Procedure Laterality Date    ARM SURGERY Left     CERVIX SURGERY      LITHOTRIPSY      2019    PAIN MANAGEMENT PROCEDURE N/A 8/16/2024    CERVICAL 7-THORACIC 1 EPIDURAL STEROID INJECTION UNDER FLUOROSCOPIC GUIDANCE performed by Monika Sheth MD at [facility] OR    PAIN MANAGEMENT PROCEDURE Right 2/11/2025    CERVICAL 7-THORACIC 1 EPIDURAL STEROID INJECTION RIGHT PARAMEDIAN UNDER FLUOROSCOPIC GUIDANCE   +++LATEX ALLERGY+++ performed by Monika Sheth MD at [facility] OR    TUBAL LIGATION      2002     Allergies:    Latex and Mushroom extract complex (obsolete)  Social

## 2025-04-29 ENCOUNTER — RESULTS FOLLOW-UP (OUTPATIENT)
Dept: PEDIATRICS CLINIC | Age: 45
End: 2025-04-29

## 2025-05-14 ENCOUNTER — OFFICE VISIT (OUTPATIENT)
Age: 45
End: 2025-05-14
Payer: COMMERCIAL

## 2025-05-14 VITALS
WEIGHT: 281 LBS | HEIGHT: 66 IN | OXYGEN SATURATION: 98 % | DIASTOLIC BLOOD PRESSURE: 104 MMHG | HEART RATE: 96 BPM | BODY MASS INDEX: 45.16 KG/M2 | SYSTOLIC BLOOD PRESSURE: 142 MMHG | TEMPERATURE: 96.8 F

## 2025-05-14 DIAGNOSIS — M17.0 BILATERAL PRIMARY OSTEOARTHRITIS OF KNEE: ICD-10-CM

## 2025-05-14 DIAGNOSIS — M54.12 CERVICAL RADICULOPATHY: Primary | ICD-10-CM

## 2025-05-14 DIAGNOSIS — M50.30 DDD (DEGENERATIVE DISC DISEASE), CERVICAL: ICD-10-CM

## 2025-05-14 DIAGNOSIS — M48.02 CERVICAL STENOSIS OF SPINE: ICD-10-CM

## 2025-05-14 DIAGNOSIS — M47.812 CERVICAL SPONDYLOSIS: ICD-10-CM

## 2025-05-14 PROCEDURE — 99213 OFFICE O/P EST LOW 20 MIN: CPT | Performed by: STUDENT IN AN ORGANIZED HEALTH CARE EDUCATION/TRAINING PROGRAM

## 2025-05-14 NOTE — PROGRESS NOTES
[facility]  [address] [place] [address]     Pain Medicine Follow Up Note      Prema Barney     Date of Visit:  5/14/2025    CC:  Patient presents for follow up   Chief Complaint   Patient presents with    Follow-up     Neck pain       HPI:    Pain is better.  Medication side effects: no  Recent interventional procedures: no new  Blood Thinners/Anticoagulation: no  Herbal Supplements: no  Pertinent Allergies: yes - Latex  Diabetic: no  Bowel/Bladder Incontinence: no    Previous Plan:  PT   Flexeril 5mg - taking sparingly with good relief without side effects      Interval Changes:  Still with Significant improvement in her neck and upper extremity pain since LICO   Recently burned her hand slightly on steam at cook out  Seems to be recovering well   Putting DEYSI  on it     Procedures:    8/16/24 - C7/T1 LICO - 95 % relief for 3+month  10/01/24 - B/L IA Knee inj - 99% relief for 3+ month  2/11/2025-C7-T1 LICO-95% relief for 3+ month    Previous Treatments:    ibuprofen, lidocaine rub, gabapentin, Percocet, Flexeril, steroids, Wellbutrin, PT, chiropractic therapy, injections.     Potential Aberrant Drug-Related Behavior:  no      Imaging/Studies: New: no     XRAY:  XR Knees 7/24  IMPRESSION:  Moderate tricompartmental DJD with medial joint space narrowing right  slightly greater than left of involvement without acute fracture.      MRI:  MRI CERVICAL SPINE WO CONTRAST 07/19/2024     BONES/ALIGNMENT: There is normal alignment of the spine. The vertebral body  heights are maintained. The bone marrow signal appears unremarkable.    SPINAL CORD: No abnormal cord signal is seen.    SOFT TISSUES: No paraspinal mass identified.    C2-C3: There is no significant disc protrusion, spinal canal stenosis or  neural foraminal narrowing.    C3-C4: No significant disc herniation.  Mild uncovertebral joint hypertrophy.  No significant central canal or neural foraminal stenosis.    C4-C5: Disc

## 2025-05-14 NOTE — PROGRESS NOTES
Prema Barney presents to the [facility] on 5/14/2025. Prema is complaining of pain neck. The pain is intermittent. The pain is described as aching, shooting, and sharp. Pain is rated on her best day at a 1, on her worst day at a 5, and on average at a 3 on the VAS scale. She took her last dose of Motrin and Flexeril 05/13.      Any procedures since your last visit: No    She is  on NSAIDS and  is not on anticoagulation medications       Pacemaker or defibrillator: No     Do you want someone present when the provider examines you? No    Medication Contract and Consent for Opioid Use Documents Filed        No documents found                       BP (!) 142/104   Pulse 96   Temp 96.8 °F (36 °C)   Ht 1.676 m (5' 6\")   Wt 127.5 kg (281 lb)   SpO2 98%   BMI 45.35 kg/m²      Prema's blood pressure was elevated today. She was instructed to contact his primary care provider as soon as possible.    No LMP recorded.

## 2025-05-20 ENCOUNTER — APPOINTMENT (OUTPATIENT)
Dept: CT IMAGING | Age: 45
End: 2025-05-20
Payer: COMMERCIAL

## 2025-05-20 ENCOUNTER — HOSPITAL ENCOUNTER (EMERGENCY)
Age: 45
Discharge: HOME | End: 2025-05-21
Attending: EMERGENCY MEDICINE
Payer: COMMERCIAL

## 2025-05-20 DIAGNOSIS — R74.8 ELEVATED ALKALINE PHOSPHATASE LEVEL: ICD-10-CM

## 2025-05-20 DIAGNOSIS — G43.909 MIGRAINE WITHOUT STATUS MIGRAINOSUS, NOT INTRACTABLE, UNSPECIFIED MIGRAINE TYPE: Primary | ICD-10-CM

## 2025-05-20 LAB
ALBUMIN SERPL-MCNC: 4.6 G/DL (ref 3.5–5.2)
ALP SERPL-CCNC: 136 U/L (ref 35–104)
ALT SERPL-CCNC: 30 U/L (ref 0–32)
ANION GAP SERPL CALCULATED.3IONS-SCNC: 17 MMOL/L (ref 7–16)
AST SERPL-CCNC: 29 U/L (ref 0–31)
BASOPHILS # BLD: 0.02 K/UL (ref 0–0.2)
BASOPHILS NFR BLD: 0 % (ref 0–2)
BILIRUB SERPL-MCNC: 0.4 MG/DL (ref 0–1.2)
BUN SERPL-MCNC: 10 MG/DL (ref 6–20)
CALCIUM SERPL-MCNC: 9.5 MG/DL (ref 8.6–10.2)
CHLORIDE SERPL-SCNC: 99 MMOL/L (ref 98–107)
CO2 SERPL-SCNC: 20 MMOL/L (ref 22–29)
CREAT SERPL-MCNC: 0.7 MG/DL (ref 0.5–1)
EOSINOPHIL # BLD: 0.15 K/UL (ref 0.05–0.5)
EOSINOPHILS RELATIVE PERCENT: 1 % (ref 0–6)
ERYTHROCYTE [DISTWIDTH] IN BLOOD BY AUTOMATED COUNT: 13.6 % (ref 11.5–15)
GFR, ESTIMATED: >90 ML/MIN/1.73M2
GLUCOSE SERPL-MCNC: 152 MG/DL (ref 74–99)
HCT VFR BLD AUTO: 37.3 % (ref 34–48)
HGB BLD-MCNC: 11.6 G/DL (ref 11.5–15.5)
IMM GRANULOCYTES # BLD AUTO: 0.04 K/UL (ref 0–0.58)
IMM GRANULOCYTES NFR BLD: 0 % (ref 0–5)
LYMPHOCYTES NFR BLD: 2.11 K/UL (ref 1.5–4)
LYMPHOCYTES RELATIVE PERCENT: 18 % (ref 20–42)
MCH RBC QN AUTO: 26 PG (ref 26–35)
MCHC RBC AUTO-ENTMCNC: 31.1 G/DL (ref 32–34.5)
MCV RBC AUTO: 83.4 FL (ref 80–99.9)
MONOCYTES NFR BLD: 0.61 K/UL (ref 0.1–0.95)
MONOCYTES NFR BLD: 5 % (ref 2–12)
NEUTROPHILS NFR BLD: 75 % (ref 43–80)
NEUTS SEG NFR BLD: 9.01 K/UL (ref 1.8–7.3)
PLATELET # BLD AUTO: 371 K/UL (ref 130–450)
PMV BLD AUTO: 11.4 FL (ref 7–12)
POTASSIUM SERPL-SCNC: 4.1 MMOL/L (ref 3.5–5)
PROT SERPL-MCNC: 8.5 G/DL (ref 6.4–8.3)
RBC # BLD AUTO: 4.47 M/UL (ref 3.5–5.5)
SODIUM SERPL-SCNC: 136 MMOL/L (ref 132–146)
WBC OTHER # BLD: 11.9 K/UL (ref 4.5–11.5)

## 2025-05-20 PROCEDURE — 70450 CT HEAD/BRAIN W/O DYE: CPT

## 2025-05-20 PROCEDURE — 96374 THER/PROPH/DIAG INJ IV PUSH: CPT

## 2025-05-20 PROCEDURE — 99284 EMERGENCY DEPT VISIT MOD MDM: CPT

## 2025-05-20 PROCEDURE — 6360000002 HC RX W HCPCS: Performed by: EMERGENCY MEDICINE

## 2025-05-20 PROCEDURE — 2580000003 HC RX 258: Performed by: EMERGENCY MEDICINE

## 2025-05-20 PROCEDURE — 96375 TX/PRO/DX INJ NEW DRUG ADDON: CPT

## 2025-05-20 PROCEDURE — 85025 COMPLETE CBC W/AUTO DIFF WBC: CPT

## 2025-05-20 PROCEDURE — 80053 COMPREHEN METABOLIC PANEL: CPT

## 2025-05-20 RX ORDER — NAPROXEN 500 MG/1
500 TABLET ORAL 2 TIMES DAILY PRN
Qty: 60 TABLET | Refills: 0 | Status: SHIPPED | OUTPATIENT
Start: 2025-05-20

## 2025-05-20 RX ORDER — CYCLOBENZAPRINE HCL 10 MG
5 TABLET ORAL 3 TIMES DAILY PRN
Qty: 20 TABLET | Refills: 0 | Status: SHIPPED | OUTPATIENT
Start: 2025-05-20

## 2025-05-20 RX ORDER — DIPHENHYDRAMINE HYDROCHLORIDE 50 MG/ML
25 INJECTION, SOLUTION INTRAMUSCULAR; INTRAVENOUS ONCE
Status: COMPLETED | OUTPATIENT
Start: 2025-05-20 | End: 2025-05-20

## 2025-05-20 RX ORDER — 0.9 % SODIUM CHLORIDE 0.9 %
1000 INTRAVENOUS SOLUTION INTRAVENOUS ONCE
Status: COMPLETED | OUTPATIENT
Start: 2025-05-20 | End: 2025-05-21

## 2025-05-20 RX ORDER — DEXAMETHASONE SODIUM PHOSPHATE 10 MG/ML
10 INJECTION, SOLUTION INTRA-ARTICULAR; INTRALESIONAL; INTRAMUSCULAR; INTRAVENOUS; SOFT TISSUE ONCE
Status: COMPLETED | OUTPATIENT
Start: 2025-05-20 | End: 2025-05-20

## 2025-05-20 RX ORDER — METOCLOPRAMIDE HYDROCHLORIDE 5 MG/ML
10 INJECTION INTRAMUSCULAR; INTRAVENOUS ONCE
Status: COMPLETED | OUTPATIENT
Start: 2025-05-20 | End: 2025-05-20

## 2025-05-20 RX ORDER — SUMATRIPTAN 50 MG/1
50 TABLET, FILM COATED ORAL
Qty: 9 TABLET | Refills: 0 | Status: SHIPPED | OUTPATIENT
Start: 2025-05-20

## 2025-05-20 RX ADMIN — METOCLOPRAMIDE 10 MG: 5 INJECTION, SOLUTION INTRAMUSCULAR; INTRAVENOUS at 22:22

## 2025-05-20 RX ADMIN — DIPHENHYDRAMINE HYDROCHLORIDE 25 MG: 50 INJECTION INTRAMUSCULAR; INTRAVENOUS at 22:22

## 2025-05-20 RX ADMIN — DEXAMETHASONE SODIUM PHOSPHATE 10 MG: 10 INJECTION INTRAMUSCULAR; INTRAVENOUS at 22:22

## 2025-05-20 RX ADMIN — SODIUM CHLORIDE 1000 ML: 0.9 INJECTION, SOLUTION INTRAVENOUS at 22:19

## 2025-05-20 ASSESSMENT — PAIN DESCRIPTION - LOCATION: LOCATION: HEAD

## 2025-05-20 ASSESSMENT — PAIN DESCRIPTION - PAIN TYPE: TYPE: ACUTE PAIN

## 2025-05-20 ASSESSMENT — PAIN DESCRIPTION - FREQUENCY: FREQUENCY: CONTINUOUS

## 2025-05-20 ASSESSMENT — ENCOUNTER SYMPTOMS: PHOTOPHOBIA: 1

## 2025-05-20 ASSESSMENT — PAIN - FUNCTIONAL ASSESSMENT: PAIN_FUNCTIONAL_ASSESSMENT: 0-10

## 2025-05-20 ASSESSMENT — PAIN SCALES - GENERAL: PAINLEVEL_OUTOF10: 10

## 2025-05-20 NOTE — ED TRIAGE NOTES
Department of Emergency Medicine    FIRST PROVIDER TRIAGE NOTE             Independent MLP           5/20/25  5:19 PM EDT    Date of Encounter: 5/20/25   MRN: [id number]    Vitals:    05/20/25 1702   BP: (!) 168/125   Pulse: 99   Temp: 97.5 °F (36.4 °C)   TempSrc: Oral      HPI: Prema Barney is a 44 y.o. female who presents to the ED for other (Migraine for over 30 hours)     ROS: Negative for cp or sob.    Physical Exam:   Gen Appearance/Constitutional: alert  CV: regular rate     Initial Plan of Care: All treatment areas with department are currently occupied.     Plan to order/Initiate the following while awaiting opening in ED: Triage evaluation.    Provider-Patient relationship only established for Provider In Triage (PIT).  Full assessment, HPI, and examination not performed, therefore, it is not yet possible to state whether or not an emergency medical condition exists.  This provider not responsible to follow or interpret any labs or testing ordered in triage.  Supervisor request for BETH to initiate contact and input an assessment note in triage during high volume surges.     Initial Plan of Care: Initiate Treatment-Testing, Proceed toTreatment Area When Bed Available for ED Attending/MLP to Continue Care  Secondary to high volume, low staffing, and/or boarding- patient to await bed availability.    This ends my PIT-Patient relationship.  Care of patient relinquished after triage    Electronically signed by Rocío Garrido PA-C   DD: 5/20/25

## 2025-05-21 VITALS
OXYGEN SATURATION: 97 % | RESPIRATION RATE: 18 BRPM | DIASTOLIC BLOOD PRESSURE: 88 MMHG | TEMPERATURE: 97.5 F | SYSTOLIC BLOOD PRESSURE: 148 MMHG | HEART RATE: 90 BPM

## 2025-05-21 NOTE — DISCHARGE INSTRUCTIONS
Call family doctor and specialist to schedule an appointment    Have your doctor obtain copies of all results and records and re-review them with you    Please take your papers with you to all followup appointments    If symptoms reoccur or worsen or if you believe you need emergency services for any other reason return to [facility]    CT HEAD WO CONTRAST   Final Result   No acute intracranial abnormality.

## 2025-05-21 NOTE — ED PROVIDER NOTES
[facility] EMERGENCY DEPARTMENT  EMERGENCY DEPARTMENT ENCOUNTER        Pt Name: Prema Barney  MRN: [id number]  Birthdate 1980  Date of evaluation: 5/20/2025  Provider: Andi Laura DO  PCP: Natty Pierce MD  Note Started: 10:30 PM EDT 5/20/25    CHIEF COMPLAINT       Chief Complaint   Patient presents with    other     Migraine for over 30 hours       HISTORY OF PRESENT ILLNESS: 1 or more Elements     History from : Patient    Limitations to history : None    Prema Barney is a 44 y.o. female who presents with history of migraine headaches.  She states she used to be on Imitrex but had an insurance change and she had not refilled it.  She has history of cervical stenosis and cervical radiculopathy.  She has a migraine since 11 AM yesterday that radiates from essentially her inion forward.  She denies any nuchal rigidity or meningismus she denies any fever cough or cold symptoms abdominal pain chest pain urinary tract symptoms she denies any focal weakness or numbness she is having photophobia.  She has taken over-the-counter ibuprofen without relief.  She believes stress from work is a trigger she works at [employer].    Nursing Notes were all reviewed and agreed with or any disagreements were addressed in the HPI.    REVIEW OF SYSTEMS :      Review of Systems   Eyes:  Positive for photophobia.   Neurological:  Positive for headaches. Negative for dizziness, seizures, syncope, speech difficulty, weakness and light-headedness.       Positives and Pertinent negatives as per HPI.     SURGICAL HISTORY     Past Surgical History:   Procedure Laterality Date    ARM SURGERY Left     CERVIX SURGERY      LITHOTRIPSY      2019    PAIN MANAGEMENT PROCEDURE N/A 8/16/2024    CERVICAL 7-THORACIC 1 EPIDURAL STEROID INJECTION UNDER FLUOROSCOPIC GUIDANCE performed by Monika Sheth MD at [facility] OR    PAIN MANAGEMENT PROCEDURE Right 2/11/2025    CERVICAL 7-THORACIC 1 EPIDURAL STEROID

## 2025-05-22 ENCOUNTER — OFFICE VISIT (OUTPATIENT)
Dept: FAMILY MEDICINE CLINIC | Age: 45
End: 2025-05-22
Payer: COMMERCIAL

## 2025-05-22 VITALS
HEIGHT: 66 IN | WEIGHT: 275.4 LBS | TEMPERATURE: 97.3 F | OXYGEN SATURATION: 98 % | SYSTOLIC BLOOD PRESSURE: 150 MMHG | BODY MASS INDEX: 44.26 KG/M2 | DIASTOLIC BLOOD PRESSURE: 96 MMHG | RESPIRATION RATE: 17 BRPM | HEART RATE: 92 BPM

## 2025-05-22 DIAGNOSIS — R22.0 GINGIVAL SWELLING: ICD-10-CM

## 2025-05-22 DIAGNOSIS — R22.0 FACIAL SWELLING: Primary | ICD-10-CM

## 2025-05-22 DIAGNOSIS — K08.89 TOOTH PAIN: ICD-10-CM

## 2025-05-22 DIAGNOSIS — R03.0 ELEVATED BLOOD PRESSURE READING: ICD-10-CM

## 2025-05-22 PROCEDURE — 99213 OFFICE O/P EST LOW 20 MIN: CPT

## 2025-05-22 RX ORDER — IBUPROFEN 800 MG/1
800 TABLET, FILM COATED ORAL EVERY 6 HOURS PRN
Qty: 30 TABLET | Refills: 0 | Status: SHIPPED | OUTPATIENT
Start: 2025-05-22 | End: 2025-06-01

## 2025-05-22 ASSESSMENT — ENCOUNTER SYMPTOMS
SINUS PAIN: 0
RESPIRATORY NEGATIVE: 1
SINUS PRESSURE: 0
FACIAL SWELLING: 1

## 2025-05-22 NOTE — PROGRESS NOTES
transcribed using voice recognition software. Every effort was made to ensure accuracy; however, inadvertent computerized transcription errors may be present

## 2025-08-20 ENCOUNTER — OFFICE VISIT (OUTPATIENT)
Age: 45
End: 2025-08-20
Payer: COMMERCIAL

## 2025-08-20 VITALS
HEART RATE: 90 BPM | OXYGEN SATURATION: 97 % | DIASTOLIC BLOOD PRESSURE: 98 MMHG | BODY MASS INDEX: 44.2 KG/M2 | WEIGHT: 275 LBS | TEMPERATURE: 97.6 F | HEIGHT: 66 IN | SYSTOLIC BLOOD PRESSURE: 142 MMHG

## 2025-08-20 DIAGNOSIS — M25.561 BILATERAL CHRONIC KNEE PAIN: ICD-10-CM

## 2025-08-20 DIAGNOSIS — M17.0 BILATERAL PRIMARY OSTEOARTHRITIS OF KNEE: ICD-10-CM

## 2025-08-20 DIAGNOSIS — G89.29 BILATERAL CHRONIC KNEE PAIN: ICD-10-CM

## 2025-08-20 DIAGNOSIS — M47.812 CERVICAL SPONDYLOSIS: ICD-10-CM

## 2025-08-20 DIAGNOSIS — M50.30 DDD (DEGENERATIVE DISC DISEASE), CERVICAL: ICD-10-CM

## 2025-08-20 DIAGNOSIS — M48.02 CERVICAL STENOSIS OF SPINE: ICD-10-CM

## 2025-08-20 DIAGNOSIS — M25.562 BILATERAL CHRONIC KNEE PAIN: ICD-10-CM

## 2025-08-20 DIAGNOSIS — M54.12 CERVICAL RADICULOPATHY: Primary | ICD-10-CM

## 2025-08-20 DIAGNOSIS — G89.29 CHRONIC NECK PAIN: ICD-10-CM

## 2025-08-20 DIAGNOSIS — M54.2 CHRONIC NECK PAIN: ICD-10-CM

## 2025-08-20 DIAGNOSIS — M54.16 LUMBAR RADICULOPATHY: ICD-10-CM

## 2025-08-20 PROCEDURE — 99214 OFFICE O/P EST MOD 30 MIN: CPT | Performed by: PHYSICIAN ASSISTANT

## (undated) DEVICE — NEEDLE HYPO 25GA L1.5IN BLU POLYPR HUB S STL REG BVL STR

## (undated) DEVICE — NEEDLE HYPO 18GA L1.5IN PNK POLYPR HUB S STL REG BVL STR

## (undated) DEVICE — Device: Brand: PORTEX

## (undated) DEVICE — SYRINGE MED 5ML STD CLR PLAS LUERLOCK TIP N CTRL DISP

## (undated) DEVICE — BANDAGE ADH W1XL3IN NAT FAB WVN FLX DURABLE N ADH PD SEAL

## (undated) DEVICE — 3M™ RED DOT™ MONITORING ELECTRODE WITH FOAM TAPE AND STICKY GEL 2560, 50/BAG, 20/CASE, 72/PLT: Brand: RED DOT™

## (undated) DEVICE — 12 ML SYRINGE,LUER-LOCK TIP: Brand: MONOJECT

## (undated) DEVICE — GLOVE ORANGE PI 7 1/2   MSG9075

## (undated) DEVICE — GAUZE,SPONGE,4"X4",8PLY,STRL,LF,10/TRAY: Brand: MEDLINE

## (undated) DEVICE — 6 ML SYRINGE LUER-LOCK TIP: Brand: MONOJECT

## (undated) DEVICE — NON-DEHP CATHETER EXTENSION SET, MALE LUER LOCK ADAPTER